# Patient Record
Sex: FEMALE | Race: BLACK OR AFRICAN AMERICAN | Employment: FULL TIME | URBAN - METROPOLITAN AREA
[De-identification: names, ages, dates, MRNs, and addresses within clinical notes are randomized per-mention and may not be internally consistent; named-entity substitution may affect disease eponyms.]

---

## 2017-02-28 ENCOUNTER — HOSPITAL ENCOUNTER (EMERGENCY)
Age: 22
Discharge: HOME | End: 2017-02-28
Attending: EMERGENCY MEDICINE
Payer: MEDICARE

## 2017-02-28 VITALS
RESPIRATION RATE: 16 BRPM | TEMPERATURE: 98.1 F | OXYGEN SATURATION: 100 % | SYSTOLIC BLOOD PRESSURE: 129 MMHG | HEART RATE: 98 BPM | DIASTOLIC BLOOD PRESSURE: 87 MMHG

## 2017-02-28 DIAGNOSIS — T78.40XA ALLERGIC REACTION, INITIAL ENCOUNTER: Primary | ICD-10-CM

## 2017-02-28 PROCEDURE — 99283 EMERGENCY DEPT VISIT LOW MDM: CPT

## 2017-02-28 PROCEDURE — 6370000000 HC RX 637 (ALT 250 FOR IP): Performed by: EMERGENCY MEDICINE

## 2017-02-28 RX ORDER — FAMOTIDINE 20 MG/1
20 TABLET, FILM COATED ORAL ONCE
Status: COMPLETED | OUTPATIENT
Start: 2017-02-28 | End: 2017-02-28

## 2017-02-28 RX ORDER — DIAPER,BRIEF,INFANT-TODD,DISP
EACH MISCELLANEOUS 2 TIMES DAILY
Status: DISCONTINUED | OUTPATIENT
Start: 2017-02-28 | End: 2017-02-28 | Stop reason: HOSPADM

## 2017-02-28 RX ORDER — DIPHENHYDRAMINE HCL 25 MG
25 CAPSULE ORAL EVERY 4 HOURS PRN
Qty: 10 CAPSULE | Refills: 0 | Status: SHIPPED | OUTPATIENT
Start: 2017-02-28 | End: 2017-03-10

## 2017-02-28 RX ORDER — EPINEPHRINE 0.3 MG/.3ML
0.3 INJECTION SUBCUTANEOUS ONCE
Qty: 0.3 ML | Refills: 0 | Status: SHIPPED | OUTPATIENT
Start: 2017-02-28 | End: 2020-07-18

## 2017-02-28 RX ORDER — FAMOTIDINE 20 MG/1
20 TABLET, FILM COATED ORAL 2 TIMES DAILY
Qty: 10 TABLET | Refills: 0 | Status: SHIPPED | OUTPATIENT
Start: 2017-02-28 | End: 2018-01-18 | Stop reason: ALTCHOICE

## 2017-02-28 RX ORDER — DIPHENHYDRAMINE HCL 25 MG
25 TABLET ORAL ONCE
Status: COMPLETED | OUTPATIENT
Start: 2017-02-28 | End: 2017-02-28

## 2017-02-28 RX ADMIN — FAMOTIDINE 20 MG: 20 TABLET, FILM COATED ORAL at 16:47

## 2017-02-28 RX ADMIN — HYDROCORTISONE: 10 CREAM TOPICAL at 16:52

## 2017-02-28 RX ADMIN — DIPHENHYDRAMINE HCL 25 MG: 25 TABLET ORAL at 16:47

## 2017-04-24 ENCOUNTER — HOSPITAL ENCOUNTER (EMERGENCY)
Age: 22
Discharge: HOME | End: 2017-04-24
Attending: EMERGENCY MEDICINE
Payer: MEDICARE

## 2017-04-24 VITALS
HEART RATE: 79 BPM | WEIGHT: 140 LBS | DIASTOLIC BLOOD PRESSURE: 62 MMHG | HEIGHT: 67 IN | BODY MASS INDEX: 21.97 KG/M2 | SYSTOLIC BLOOD PRESSURE: 111 MMHG | RESPIRATION RATE: 18 BRPM | OXYGEN SATURATION: 100 % | TEMPERATURE: 97.3 F

## 2017-04-24 DIAGNOSIS — N76.4 LABIAL ABSCESS: Primary | ICD-10-CM

## 2017-04-24 PROCEDURE — 2500000003 HC RX 250 WO HCPCS: Performed by: EMERGENCY MEDICINE

## 2017-04-24 PROCEDURE — 99283 EMERGENCY DEPT VISIT LOW MDM: CPT

## 2017-04-24 PROCEDURE — 56405 I&D VULVA/PERINEAL ABSCESS: CPT

## 2017-04-24 RX ORDER — SULFAMETHOXAZOLE AND TRIMETHOPRIM 800; 160 MG/1; MG/1
1 TABLET ORAL 2 TIMES DAILY
Qty: 14 TABLET | Refills: 0 | Status: SHIPPED | OUTPATIENT
Start: 2017-04-24 | End: 2017-05-01

## 2017-04-24 RX ORDER — LIDOCAINE HYDROCHLORIDE 10 MG/ML
20 INJECTION, SOLUTION INFILTRATION; PERINEURAL ONCE
Status: COMPLETED | OUTPATIENT
Start: 2017-04-24 | End: 2017-04-24

## 2017-04-24 RX ADMIN — LIDOCAINE HYDROCHLORIDE 20 ML: 10 INJECTION, SOLUTION INFILTRATION; PERINEURAL at 22:58

## 2017-04-24 ASSESSMENT — PAIN DESCRIPTION - LOCATION: LOCATION: GROIN

## 2017-04-24 ASSESSMENT — PAIN DESCRIPTION - PAIN TYPE: TYPE: ACUTE PAIN

## 2017-04-24 ASSESSMENT — PAIN DESCRIPTION - ORIENTATION: ORIENTATION: LEFT

## 2017-04-24 ASSESSMENT — PAIN SCALES - GENERAL: PAINLEVEL_OUTOF10: 10

## 2017-09-14 ENCOUNTER — HOSPITAL ENCOUNTER (EMERGENCY)
Age: 22
Discharge: HOME | End: 2017-09-14
Attending: EMERGENCY MEDICINE
Payer: MEDICARE

## 2017-09-14 ENCOUNTER — APPOINTMENT (OUTPATIENT)
Dept: ULTRASOUND IMAGING | Age: 22
End: 2017-09-14
Payer: MEDICARE

## 2017-09-14 VITALS
RESPIRATION RATE: 16 BRPM | HEART RATE: 77 BPM | TEMPERATURE: 98.1 F | SYSTOLIC BLOOD PRESSURE: 139 MMHG | OXYGEN SATURATION: 100 % | DIASTOLIC BLOOD PRESSURE: 74 MMHG

## 2017-09-14 DIAGNOSIS — Z3A.08 8 WEEKS GESTATION OF PREGNANCY: ICD-10-CM

## 2017-09-14 DIAGNOSIS — A49.8 GARDNERELLA INFECTION: ICD-10-CM

## 2017-09-14 DIAGNOSIS — R10.31 RIGHT LOWER QUADRANT ABDOMINAL PAIN: Primary | ICD-10-CM

## 2017-09-14 DIAGNOSIS — R03.0 ELEVATED BLOOD PRESSURE READING: ICD-10-CM

## 2017-09-14 LAB
-: NORMAL
ABSOLUTE EOS #: 0.1 K/UL (ref 0–0.4)
ABSOLUTE LYMPH #: 2.1 K/UL (ref 1–4.8)
ABSOLUTE MONO #: 0.8 K/UL (ref 0.1–1.4)
ALBUMIN SERPL-MCNC: 4.5 G/DL (ref 3.5–5.2)
ALBUMIN/GLOBULIN RATIO: 1.4 (ref 1–2.5)
ALP BLD-CCNC: 41 U/L (ref 35–104)
ALT SERPL-CCNC: 8 U/L (ref 5–33)
AMORPHOUS: NORMAL
ANION GAP SERPL CALCULATED.3IONS-SCNC: 13 MMOL/L (ref 9–17)
AST SERPL-CCNC: 15 U/L
BACTERIA: NORMAL
BASOPHILS # BLD: 1 %
BASOPHILS ABSOLUTE: 0.1 K/UL (ref 0–0.2)
BILIRUB SERPL-MCNC: 0.61 MG/DL (ref 0.3–1.2)
BILIRUBIN DIRECT: 0.14 MG/DL
BILIRUBIN URINE: NEGATIVE
BILIRUBIN, INDIRECT: 0.47 MG/DL (ref 0–1)
BUN BLDV-MCNC: 6 MG/DL (ref 6–20)
BUN/CREAT BLD: ABNORMAL (ref 9–20)
C-REACTIVE PROTEIN: 0.7 MG/L (ref 0–5)
CALCIUM SERPL-MCNC: 9.3 MG/DL (ref 8.6–10.4)
CASTS UA: NORMAL /LPF (ref 0–8)
CHLORIDE BLD-SCNC: 101 MMOL/L (ref 98–107)
CO2: 24 MMOL/L (ref 20–31)
COLOR: YELLOW
COMMENT UA: ABNORMAL
CREAT SERPL-MCNC: 0.45 MG/DL (ref 0.5–0.9)
CRYSTALS, UA: NORMAL /HPF
DIFFERENTIAL TYPE: ABNORMAL
DIRECT EXAM: ABNORMAL
EOSINOPHILS RELATIVE PERCENT: 1 %
EPITHELIAL CELLS UA: NORMAL /HPF (ref 0–5)
GFR AFRICAN AMERICAN: >60 ML/MIN
GFR NON-AFRICAN AMERICAN: >60 ML/MIN
GFR SERPL CREATININE-BSD FRML MDRD: ABNORMAL ML/MIN/{1.73_M2}
GFR SERPL CREATININE-BSD FRML MDRD: ABNORMAL ML/MIN/{1.73_M2}
GLOBULIN: NORMAL G/DL (ref 1.5–3.8)
GLUCOSE BLD-MCNC: 95 MG/DL (ref 70–99)
GLUCOSE URINE: NEGATIVE
HCG QUALITATIVE: POSITIVE
HCG QUANTITATIVE: ABNORMAL IU/L
HCT VFR BLD CALC: 37.5 % (ref 36–46)
HEMOGLOBIN: 12.8 G/DL (ref 12–16)
KETONES, URINE: ABNORMAL
LEUKOCYTE ESTERASE, URINE: ABNORMAL
LIPASE: 16 U/L (ref 13–60)
LYMPHOCYTES # BLD: 38 %
Lab: ABNORMAL
MCH RBC QN AUTO: 29.9 PG (ref 26–34)
MCHC RBC AUTO-ENTMCNC: 34 G/DL (ref 31–37)
MCV RBC AUTO: 88.1 FL (ref 80–100)
MONOCYTES # BLD: 15 %
MUCUS: NORMAL
NITRITE, URINE: NEGATIVE
OTHER OBSERVATIONS UA: NORMAL
PDW BLD-RTO: 16.2 % (ref 12.5–15.4)
PH UA: 7.5 (ref 5–8)
PLATELET # BLD: 240 K/UL (ref 140–450)
PLATELET ESTIMATE: ABNORMAL
PMV BLD AUTO: 7.8 FL (ref 6–12)
POTASSIUM SERPL-SCNC: 3.6 MMOL/L (ref 3.7–5.3)
PROTEIN UA: NEGATIVE
RBC # BLD: 4.26 M/UL (ref 4–5.2)
RBC # BLD: ABNORMAL 10*6/UL
RBC UA: NORMAL /HPF (ref 0–4)
RENAL EPITHELIAL, UA: NORMAL /HPF
SEG NEUTROPHILS: 45 %
SEGMENTED NEUTROPHILS ABSOLUTE COUNT: 2.4 K/UL (ref 1.8–7.7)
SODIUM BLD-SCNC: 138 MMOL/L (ref 135–144)
SPECIFIC GRAVITY UA: 1.02 (ref 1–1.03)
SPECIMEN DESCRIPTION: ABNORMAL
STATUS: ABNORMAL
TOTAL PROTEIN: 7.8 G/DL (ref 6.4–8.3)
TRICHOMONAS: NORMAL
TURBIDITY: ABNORMAL
URINE HGB: NEGATIVE
UROBILINOGEN, URINE: NORMAL
WBC # BLD: 5.5 K/UL (ref 4.5–13.5)
WBC # BLD: ABNORMAL 10*3/UL
WBC UA: NORMAL /HPF (ref 0–5)
YEAST: NORMAL

## 2017-09-14 PROCEDURE — 87660 TRICHOMONAS VAGIN DIR PROBE: CPT

## 2017-09-14 PROCEDURE — 83690 ASSAY OF LIPASE: CPT

## 2017-09-14 PROCEDURE — 96361 HYDRATE IV INFUSION ADD-ON: CPT

## 2017-09-14 PROCEDURE — 80076 HEPATIC FUNCTION PANEL: CPT

## 2017-09-14 PROCEDURE — 86140 C-REACTIVE PROTEIN: CPT

## 2017-09-14 PROCEDURE — G0383 LEV 4 HOSP TYPE B ED VISIT: HCPCS

## 2017-09-14 PROCEDURE — 87510 GARDNER VAG DNA DIR PROBE: CPT

## 2017-09-14 PROCEDURE — 87086 URINE CULTURE/COLONY COUNT: CPT

## 2017-09-14 PROCEDURE — 76705 ECHO EXAM OF ABDOMEN: CPT

## 2017-09-14 PROCEDURE — 6360000002 HC RX W HCPCS: Performed by: PHYSICIAN ASSISTANT

## 2017-09-14 PROCEDURE — 81001 URINALYSIS AUTO W/SCOPE: CPT

## 2017-09-14 PROCEDURE — 84703 CHORIONIC GONADOTROPIN ASSAY: CPT

## 2017-09-14 PROCEDURE — 96374 THER/PROPH/DIAG INJ IV PUSH: CPT

## 2017-09-14 PROCEDURE — 80048 BASIC METABOLIC PNL TOTAL CA: CPT

## 2017-09-14 PROCEDURE — 76815 OB US LIMITED FETUS(S): CPT

## 2017-09-14 PROCEDURE — 84702 CHORIONIC GONADOTROPIN TEST: CPT

## 2017-09-14 PROCEDURE — 87491 CHLMYD TRACH DNA AMP PROBE: CPT

## 2017-09-14 PROCEDURE — 85025 COMPLETE CBC W/AUTO DIFF WBC: CPT

## 2017-09-14 PROCEDURE — 87480 CANDIDA DNA DIR PROBE: CPT

## 2017-09-14 PROCEDURE — 2580000003 HC RX 258: Performed by: PHYSICIAN ASSISTANT

## 2017-09-14 PROCEDURE — 87591 N.GONORRHOEAE DNA AMP PROB: CPT

## 2017-09-14 RX ORDER — METRONIDAZOLE 500 MG/1
500 TABLET ORAL 2 TIMES DAILY
Qty: 14 TABLET | Refills: 0 | Status: SHIPPED | OUTPATIENT
Start: 2017-09-14 | End: 2017-09-21

## 2017-09-14 RX ORDER — CEPHALEXIN 500 MG/1
500 CAPSULE ORAL 2 TIMES DAILY
Qty: 14 CAPSULE | Refills: 0 | Status: SHIPPED | OUTPATIENT
Start: 2017-09-14 | End: 2017-09-21

## 2017-09-14 RX ORDER — ONDANSETRON 2 MG/ML
4 INJECTION INTRAMUSCULAR; INTRAVENOUS ONCE
Status: COMPLETED | OUTPATIENT
Start: 2017-09-14 | End: 2017-09-14

## 2017-09-14 RX ORDER — PNV NO.95/FERROUS FUM/FOLIC AC 28MG-0.8MG
1 TABLET ORAL DAILY
Qty: 30 TABLET | Refills: 0 | Status: SHIPPED | OUTPATIENT
Start: 2017-09-14 | End: 2018-01-18 | Stop reason: ALTCHOICE

## 2017-09-14 RX ORDER — 0.9 % SODIUM CHLORIDE 0.9 %
1000 INTRAVENOUS SOLUTION INTRAVENOUS ONCE
Status: COMPLETED | OUTPATIENT
Start: 2017-09-14 | End: 2017-09-14

## 2017-09-14 RX ORDER — DOXYLAMINE SUCCINATE AND PYRIDOXINE HYDROCHLORIDE, DELAYED RELEASE TABLETS 10 MG/10 MG 10; 10 MG/1; MG/1
2 TABLET, DELAYED RELEASE ORAL NIGHTLY
Qty: 60 TABLET | Refills: 0 | Status: SHIPPED | OUTPATIENT
Start: 2017-09-14 | End: 2018-01-18

## 2017-09-14 RX ADMIN — SODIUM CHLORIDE 1000 ML: 9 INJECTION, SOLUTION INTRAVENOUS at 15:23

## 2017-09-14 RX ADMIN — ONDANSETRON 4 MG: 2 INJECTION, SOLUTION INTRAMUSCULAR; INTRAVENOUS at 15:23

## 2017-09-14 ASSESSMENT — ENCOUNTER SYMPTOMS
EYE DISCHARGE: 0
VOMITING: 1
EYE ITCHING: 0
COUGH: 0
EYE PAIN: 0
COLOR CHANGE: 0
WHEEZING: 0
DIARRHEA: 1
RHINORRHEA: 0
NAUSEA: 1
BACK PAIN: 0
SORE THROAT: 0
ABDOMINAL PAIN: 1

## 2017-09-14 ASSESSMENT — PAIN DESCRIPTION - PAIN TYPE: TYPE: ACUTE PAIN

## 2017-09-14 ASSESSMENT — PAIN DESCRIPTION - PROGRESSION: CLINICAL_PROGRESSION: GRADUALLY WORSENING

## 2017-09-14 ASSESSMENT — PAIN DESCRIPTION - DESCRIPTORS: DESCRIPTORS: CRAMPING

## 2017-09-14 ASSESSMENT — PAIN DESCRIPTION - ONSET: ONSET: PROGRESSIVE

## 2017-09-14 ASSESSMENT — PAIN DESCRIPTION - ORIENTATION: ORIENTATION: RIGHT

## 2017-09-14 ASSESSMENT — PAIN SCALES - GENERAL: PAINLEVEL_OUTOF10: 9

## 2017-09-14 ASSESSMENT — PAIN DESCRIPTION - LOCATION: LOCATION: ABDOMEN

## 2017-09-15 LAB
C TRACH DNA GENITAL QL NAA+PROBE: NEGATIVE
CULTURE: NORMAL
CULTURE: NORMAL
Lab: NORMAL
N. GONORRHOEAE DNA: ABNORMAL
SPECIMEN DESCRIPTION: NORMAL
STATUS: NORMAL

## 2017-10-01 ENCOUNTER — HOSPITAL ENCOUNTER (EMERGENCY)
Age: 22
Discharge: HOME | End: 2017-10-01
Attending: EMERGENCY MEDICINE
Payer: MEDICARE

## 2017-10-01 VITALS
OXYGEN SATURATION: 100 % | BODY MASS INDEX: 20.4 KG/M2 | HEIGHT: 67 IN | RESPIRATION RATE: 18 BRPM | DIASTOLIC BLOOD PRESSURE: 89 MMHG | SYSTOLIC BLOOD PRESSURE: 120 MMHG | TEMPERATURE: 97.3 F | HEART RATE: 89 BPM | WEIGHT: 130 LBS

## 2017-10-01 DIAGNOSIS — A59.9 TRICHOMONAS VAGINALIS INFECTION: ICD-10-CM

## 2017-10-01 DIAGNOSIS — B96.89 GARDNERELLA VAGINALIS INFECTION: ICD-10-CM

## 2017-10-01 DIAGNOSIS — N30.00 ACUTE CYSTITIS WITHOUT HEMATURIA: Primary | ICD-10-CM

## 2017-10-01 DIAGNOSIS — N76.0 GARDNERELLA VAGINALIS INFECTION: ICD-10-CM

## 2017-10-01 DIAGNOSIS — O21.9 NAUSEA AND VOMITING DURING PREGNANCY PRIOR TO 22 WEEKS GESTATION: ICD-10-CM

## 2017-10-01 LAB
-: NORMAL
ABSOLUTE EOS #: 0 K/UL (ref 0–0.4)
ABSOLUTE LYMPH #: 1.7 K/UL (ref 1–4.8)
ABSOLUTE MONO #: 0.4 K/UL (ref 0.1–1.4)
AMORPHOUS: NORMAL
ANION GAP SERPL CALCULATED.3IONS-SCNC: 16 MMOL/L (ref 9–17)
BACTERIA: NORMAL
BASOPHILS # BLD: 1 %
BASOPHILS ABSOLUTE: 0 K/UL (ref 0–0.2)
BILIRUBIN URINE: NEGATIVE
BUN BLDV-MCNC: 8 MG/DL (ref 6–20)
BUN/CREAT BLD: ABNORMAL (ref 9–20)
CALCIUM SERPL-MCNC: 9.4 MG/DL (ref 8.6–10.4)
CASTS UA: NORMAL /LPF (ref 0–8)
CHLORIDE BLD-SCNC: 100 MMOL/L (ref 98–107)
CO2: 19 MMOL/L (ref 20–31)
COLOR: YELLOW
COMMENT UA: ABNORMAL
CREAT SERPL-MCNC: 0.46 MG/DL (ref 0.5–0.9)
CRYSTALS, UA: NORMAL /HPF
DIFFERENTIAL TYPE: ABNORMAL
DIRECT EXAM: ABNORMAL
EOSINOPHILS RELATIVE PERCENT: 1 %
EPITHELIAL CELLS UA: NORMAL /HPF (ref 0–5)
GFR AFRICAN AMERICAN: >60 ML/MIN
GFR NON-AFRICAN AMERICAN: >60 ML/MIN
GFR SERPL CREATININE-BSD FRML MDRD: ABNORMAL ML/MIN/{1.73_M2}
GFR SERPL CREATININE-BSD FRML MDRD: ABNORMAL ML/MIN/{1.73_M2}
GLUCOSE BLD-MCNC: 80 MG/DL (ref 70–99)
GLUCOSE URINE: NEGATIVE
HCG QUANTITATIVE: ABNORMAL IU/L
HCT VFR BLD CALC: 36.7 % (ref 36–46)
HEMOGLOBIN: 12.5 G/DL (ref 12–16)
KETONES, URINE: ABNORMAL
LEUKOCYTE ESTERASE, URINE: ABNORMAL
LYMPHOCYTES # BLD: 33 %
Lab: ABNORMAL
MCH RBC QN AUTO: 30.3 PG (ref 26–34)
MCHC RBC AUTO-ENTMCNC: 34 G/DL (ref 31–37)
MCV RBC AUTO: 89.1 FL (ref 80–100)
MONOCYTES # BLD: 8 %
MUCUS: NORMAL
NITRITE, URINE: NEGATIVE
OTHER OBSERVATIONS UA: NORMAL
PDW BLD-RTO: 15.9 % (ref 12.5–15.4)
PH UA: 6.5 (ref 5–8)
PLATELET # BLD: 226 K/UL (ref 140–450)
PLATELET ESTIMATE: ABNORMAL
PMV BLD AUTO: 7.8 FL (ref 6–12)
POTASSIUM SERPL-SCNC: 3.8 MMOL/L (ref 3.7–5.3)
PROTEIN UA: NEGATIVE
RBC # BLD: 4.12 M/UL (ref 4–5.2)
RBC # BLD: ABNORMAL 10*6/UL
RBC UA: NORMAL /HPF (ref 0–4)
RENAL EPITHELIAL, UA: NORMAL /HPF
SEG NEUTROPHILS: 57 %
SEGMENTED NEUTROPHILS ABSOLUTE COUNT: 3 K/UL (ref 1.8–7.7)
SODIUM BLD-SCNC: 135 MMOL/L (ref 135–144)
SPECIFIC GRAVITY UA: 1.03 (ref 1–1.03)
SPECIMEN DESCRIPTION: ABNORMAL
STATUS: ABNORMAL
TRICHOMONAS: NORMAL
TURBIDITY: ABNORMAL
URINE HGB: NEGATIVE
UROBILINOGEN, URINE: NORMAL
WBC # BLD: 5.2 K/UL (ref 4.5–13.5)
WBC # BLD: ABNORMAL 10*3/UL
WBC UA: NORMAL /HPF (ref 0–5)
YEAST: NORMAL

## 2017-10-01 PROCEDURE — 99284 EMERGENCY DEPT VISIT MOD MDM: CPT

## 2017-10-01 PROCEDURE — 84702 CHORIONIC GONADOTROPIN TEST: CPT

## 2017-10-01 PROCEDURE — 81001 URINALYSIS AUTO W/SCOPE: CPT

## 2017-10-01 PROCEDURE — 87491 CHLMYD TRACH DNA AMP PROBE: CPT

## 2017-10-01 PROCEDURE — 87480 CANDIDA DNA DIR PROBE: CPT

## 2017-10-01 PROCEDURE — 6360000002 HC RX W HCPCS: Performed by: STUDENT IN AN ORGANIZED HEALTH CARE EDUCATION/TRAINING PROGRAM

## 2017-10-01 PROCEDURE — 86403 PARTICLE AGGLUT ANTBDY SCRN: CPT

## 2017-10-01 PROCEDURE — 96361 HYDRATE IV INFUSION ADD-ON: CPT

## 2017-10-01 PROCEDURE — 87510 GARDNER VAG DNA DIR PROBE: CPT

## 2017-10-01 PROCEDURE — 87591 N.GONORRHOEAE DNA AMP PROB: CPT

## 2017-10-01 PROCEDURE — 87660 TRICHOMONAS VAGIN DIR PROBE: CPT

## 2017-10-01 PROCEDURE — 80048 BASIC METABOLIC PNL TOTAL CA: CPT

## 2017-10-01 PROCEDURE — 85025 COMPLETE CBC W/AUTO DIFF WBC: CPT

## 2017-10-01 PROCEDURE — 87086 URINE CULTURE/COLONY COUNT: CPT

## 2017-10-01 PROCEDURE — 96374 THER/PROPH/DIAG INJ IV PUSH: CPT

## 2017-10-01 PROCEDURE — 2580000003 HC RX 258: Performed by: STUDENT IN AN ORGANIZED HEALTH CARE EDUCATION/TRAINING PROGRAM

## 2017-10-01 RX ORDER — ONDANSETRON 4 MG/1
4 TABLET, ORALLY DISINTEGRATING ORAL EVERY 8 HOURS PRN
Qty: 30 TABLET | Refills: 0 | Status: SHIPPED | OUTPATIENT
Start: 2017-10-01 | End: 2017-12-04 | Stop reason: ALTCHOICE

## 2017-10-01 RX ORDER — CEPHALEXIN 500 MG/1
500 CAPSULE ORAL 4 TIMES DAILY
Qty: 28 CAPSULE | Refills: 0 | Status: SHIPPED | OUTPATIENT
Start: 2017-10-01 | End: 2017-10-08

## 2017-10-01 RX ORDER — 0.9 % SODIUM CHLORIDE 0.9 %
1000 INTRAVENOUS SOLUTION INTRAVENOUS ONCE
Status: COMPLETED | OUTPATIENT
Start: 2017-10-01 | End: 2017-10-01

## 2017-10-01 RX ORDER — METRONIDAZOLE 500 MG/1
500 TABLET ORAL 2 TIMES DAILY
Qty: 14 TABLET | Refills: 0 | Status: SHIPPED | OUTPATIENT
Start: 2017-10-01 | End: 2017-12-04 | Stop reason: ALTCHOICE

## 2017-10-01 RX ORDER — ONDANSETRON 2 MG/ML
4 INJECTION INTRAMUSCULAR; INTRAVENOUS ONCE
Status: COMPLETED | OUTPATIENT
Start: 2017-10-01 | End: 2017-10-01

## 2017-10-01 RX ADMIN — ONDANSETRON 4 MG: 2 INJECTION, SOLUTION INTRAMUSCULAR; INTRAVENOUS at 21:17

## 2017-10-01 RX ADMIN — SODIUM CHLORIDE 1000 ML: 9 INJECTION, SOLUTION INTRAVENOUS at 21:17

## 2017-10-01 ASSESSMENT — ENCOUNTER SYMPTOMS
DIARRHEA: 0
NAUSEA: 1
ABDOMINAL PAIN: 1
SHORTNESS OF BREATH: 0
VOMITING: 1
CONSTIPATION: 1

## 2017-10-01 ASSESSMENT — PAIN DESCRIPTION - PAIN TYPE: TYPE: ACUTE PAIN

## 2017-10-01 ASSESSMENT — PAIN DESCRIPTION - LOCATION: LOCATION: ABDOMEN

## 2017-10-01 ASSESSMENT — PAIN SCALES - GENERAL: PAINLEVEL_OUTOF10: 3

## 2017-10-01 NOTE — LETTER
[facility]  [address] [facility] [address]  Phone: [phone]        October 3, 2017      Dear Gloria Sánchez  At the time of your visit to [facility] Emergency Room on  a culture was obtained. It was positive for gonorrhea  Your sex partner needs to be treated also. For treatment you need to follow up with your OB or health department.      Sincerely,        [facility]

## 2017-10-01 NOTE — ED AVS SNAPSHOT
After Visit Summary  (Discharge Instructions)    Medication List for Home    Based on the information you provided to us as well as any changes during this visit, the following is your updated medication list.  Compare this with your prescription bottles at home. If you have any questions or concerns, contact your primary care physician's office. Daily Medication List (This medication list can be shared with any Healthcare provider who is helping you manage your medications)      There are NEW medications for you. START taking them after you leave the hospital     cephALEXin 500 MG capsule   Commonly known as:  KEFLEX   Take 1 capsule by mouth 4 times daily for 7 days       metroNIDAZOLE 500 MG tablet   Commonly known as:  FLAGYL   Take 1 tablet by mouth 2 times daily Take with Food. Do NOT drink alcohol. ondansetron 4 MG disintegrating tablet   Commonly known as:  ZOFRAN ODT   Take 1 tablet by mouth every 8 hours as needed for Nausea         These are medications you told us you were taking at home, CONTINUE taking them after you leave the hospital     doxylamine-pyridoxine 10-10 MG Tbec   Commonly known as:  DICLEGIS   Take 2 tablets by mouth nightly Please take 2 tablets at bedtime.   If your symptoms persist after 2 days please increase to 1 tablet in the morning and 2 tablets at bedtime       EPINEPHrine 0.3 MG/0.3ML Soaj injection   Commonly known as:  EPIPEN 2-JEAN   Inject 0.3 mLs into the muscle once for 1 dose Use as directed for allergic reaction       Prenatal Vitamins 28-0.8 MG Tabs   Take 1 tablet by mouth daily         ASK your doctor about these medications if you have questions     famotidine 20 MG tablet   Commonly known as:  PEPCID   Take 1 tablet by mouth 2 times daily         These are the medications you have told us you were taking at home, STOP taking them after you leave the hospital     misoprostol 200 MCG tablet   Commonly known as:  CYTOTEC ondansetron 4 MG tablet   Commonly known as:  Trent Lopes            Where to Get Your Medications      You can get these medications from any pharmacy     Bring a paper prescription for each of these medications     cephALEXin 500 MG capsule    metroNIDAZOLE 500 MG tablet    ondansetron 4 MG disintegrating tablet               Allergies as of 10/1/2017     No Known Allergies      Immunizations as of 10/1/2017     Name Date Dose VIS Date Route    Influenza Virus Vaccine 10/14/2014 0.5 mL 2014 Intramuscular         After Visit Summary    This summary was created for you. Thank you for entrusting your care to us. The following information includes details about your hospital/visit stay along with steps you should take to help with your recovery once you leave the hospital.  In this packet, you will find information about the topics listed below:    · Instructions about your medications including a list of your home medications  · A summary of your hospital visit  · Follow-up appointments once you have left the hospital  · Your care plan at home      You may receive a survey regarding the care you received during your stay. Your input is valuable to us. We encourage you to complete and return your survey in the envelope provided. We hope you will choose us in the future for your healthcare needs. Patient Information     Patient Name RENETTA Arredondo 1995      Care Provided at:     Name Address Phone       [address] [id number] [phone]            Your Visit    Here you will find information about your visit, including the reason for your visit. Please take this sheet with you when you visit your doctor or other health care provider in the future. It will help determine the best possible medical care for you at that time. If you have any questions once you leave the hospital, please call the department phone number listed below. \" Turn LEFT onto [address]   \" Turn LEFT onto [address]   \" [address] is on the LEFT      From the [place]   \" Merge onto [address] / [address] toward [place]   \" Merge onto [address] via [address] on the LEFT toward [place]   \" Take the [address] exit, [address]   \" Take the [address] ramp   \" Turn LEFT onto [address]   \" Turn LEFT onto [address]   \" [address] is on the LEFT      From the [facility]   \" Merge onto [address] toward [place] / [place]   \" Take the [address] exit, [address]   \" Take the [address] ramp   \" Turn LEFT onto [address]   \" Turn LEFT onto [address] [place]   \" [address] is on the LEFT      Future Appointments     10/23/2017 3:15 PM     Appointment with Harini Jacobo DO at [address] ([phone])   Please arrive 15 minutes prior to scheduled appointment time to complete paper work, bring photo ID and insurance cards. [address] [facility]         Preventive Care        Date Due    Meningococcal Vaccine (1 of 1) 9/10/2012    Tetanus Combination Vaccine (1 - Tdap) 9/10/2015    Pneumococcal Vaccine - Pneumovax for adults aged 19-64 years with: chronic heart disease, chronic lung disease, diabetes mellitus, alcoholism, chronic liver disease, or cigarette smoking. (1 of 1 - PPSV23) 9/10/2015    Yearly Flu Vaccine (1) 9/1/2017    Pap Smear 9/10/2017            Ordered Labs Pending Results     Order Current Status    C.trachomatis N.gonorrhoeae DNA In process    Urine Culture In process           Care Plan Once You Return Home    This section includes instructions you will need to follow once you leave the hospital.  Your care team will discuss these with you, so you and those caring for you know how to best care for your health needs at home. This section may also include educational information about certain health topics that may be of help to you. and go to all appointments, and call your doctor if you are having problems. It's also a good idea to know your test results and keep a list of the medicines you take. How can you care for yourself at home? · Take your antibiotics as directed. Do not stop taking them just because you feel better. You need to take the full course of antibiotics. · Do not eat or drink anything that contains alcohol if you are taking metronidazole (Flagyl). · Keep using your medicine if you start your period. Use pads instead of tampons while using a vaginal cream or suppository. Tampons can absorb the medicine. · Wear loose cotton clothing. Do not wear nylon and other materials that hold body heat and moisture close to the skin. · Do not scratch. Relieve itching with a cold pack or a cool bath. · Do not wash your vaginal area more than once a day. Use plain water or a mild, unscented soap. Do not douche. When should you call for help? Watch closely for changes in your health, and be sure to contact your doctor if:  · You have unexpected vaginal bleeding. · You have a fever. · You have new or increased pain in your vagina or pelvis. · You are not getting better after 1 week. · Your symptoms return after you finish the course of your medicine. Where can you learn more? Go to [email or web address]://Control4.MyWave. org and sign in to your [employer] account. Enter S548 in the [facility] box to learn more about \"Bacterial Vaginosis: Care Instructions. \"     If you do not have an account, please click on the \"Sign Up Now\" link. Current as of: October 13, 2016  Content Version: 11.3  © 3012-1521 [employer]. Care instructions adapted under license by [facility] ([facility]). If you have questions about a medical condition or this instruction, always ask your healthcare professional. [address] any warranty or liability for your use of this information. Urinary Tract Infection in Pregnancy: Care Instructions  Your Care Instructions    A urinary tract infection, or UTI, is an infection of the bladder and other urinary structures. Most UTIs occur in the bladder. They often cause pain or burning when you urinate. UTI is the most common bacterial infection in pregnancy. If untreated, a UTI could lead to problems such as a kidney infection or  labor. Most UTIs can be cured with antibiotics. Your doctor will prescribe an antibiotic that is safe during pregnancy. Be sure to finish your medicine so that the infection does not spread to your kidneys. Follow-up care is a key part of your treatment and safety. Be sure to make and go to all appointments, and call your doctor if you are having problems. It's also a good idea to know your test results and keep a list of the medicines you take. How can you care for yourself at home? · Take your antibiotics as directed. Do not stop taking them just because you feel better. You need to take the full course of antibiotics. · Drink extra water and other fluids for the next day or two. This will help wash out the bacteria causing the infection. If you have kidney, heart, or liver disease and have to limit fluids, talk with your doctor before you increase the amount of fluids you drink. · Do not drink alcohol. · Urinate often. Try to empty your bladder each time. Preventing UTIs  · Drink plenty of fluids, enough so that your urine is light yellow or clear like water. This helps you urinate often, which clears bacteria from your system. If you have kidney, heart, or liver disease and have to limit fluids, talk with your doctor before you increase the amount of fluids you drink. · Urinate when you first have the urge. · Urinate right after you have sex. This is the best way for women to avoid UTIs. · When going to the bathroom, wipe from front to back to keep bacteria from entering the vagina or urethra.

## 2017-10-02 LAB
C TRACH DNA GENITAL QL NAA+PROBE: NEGATIVE
CULTURE: ABNORMAL
CULTURE: ABNORMAL
Lab: ABNORMAL
N. GONORRHOEAE DNA: ABNORMAL
SPECIMEN DESCRIPTION: ABNORMAL
STATUS: ABNORMAL

## 2017-10-02 NOTE — ED PROVIDER NOTES
Concern    Not on file     Social History Narrative    [facility]: [address] [facility]. with: Aunt           Family History   Problem Relation Age of Onset    Heart Defect Mother      \"Hole in the heart\" as a child    Seizures Sister     Heart Defect Maternal Aunt      \"Hole in the heart,\" unsure of dx, surgically repaired     Breast Cancer Neg Hx     Uterine Cancer Neg Hx     Ovarian Cancer Neg Hx     Colon Cancer Neg Hx        Allergies:  Review of patient's allergies indicates no known allergies. Home Medications:  Prior to Admission medications    Medication Sig Start Date End Date Taking? Authorizing Provider   metroNIDAZOLE (FLAGYL) 500 MG tablet Take 1 tablet by mouth 2 times daily Take with Food. Do NOT drink alcohol. 10/1/17  Yes Carlos Rodriguez MD   cephALEXin (KEFLEX) 500 MG capsule Take 1 capsule by mouth 4 times daily for 7 days 10/1/17 10/8/17 Yes Carlos Rodriguez MD   ondansetron (ZOFRAN ODT) 4 MG disintegrating tablet Take 1 tablet by mouth every 8 hours as needed for Nausea 10/1/17  Yes Carlos Rodriguez MD   doxylamine-pyridoxine (DICLEGIS) 10-10 MG TBEC Take 2 tablets by mouth nightly Please take 2 tablets at bedtime. If your symptoms persist after 2 days please increase to 1 tablet in the morning and 2 tablets at bedtime 9/14/17   Loreta Salinas PA-C   Prenatal Vit-Fe Fumarate-FA (PRENATAL VITAMINS) 28-0.8 MG TABS Take 1 tablet by mouth daily 9/14/17   Loreta Salinas PA-C   famotidine (PEPCID) 20 MG tablet Take 1 tablet by mouth 2 times daily 2/28/17   Dixon Watson MD PhD   EPINEPHrine (EPIPEN 2-JEAN) 0.3 MG/0.3ML SOAJ injection Inject 0.3 mLs into the muscle once for 1 dose Use as directed for allergic reaction 2/28/17 2/28/17  Dixon Watson MD PhD       REVIEW OF SYSTEMS    (2-9 systems for level 4, 10 or more for level 5)      Review of Systems   Constitutional: Positive for fatigue. Negative for chills and fever. Respiratory: Negative for shortness of breath. Cardiovascular: Negative for chest pain. Gastrointestinal: Positive for abdominal pain, constipation, nausea and vomiting. Negative for diarrhea. Genitourinary: Positive for decreased urine volume and pelvic pain. Negative for dysuria and hematuria. Neurological: Negative for light-headedness and headaches. PHYSICAL EXAM   (up to 7 for level 4, 8 or more for level 5)      INITIAL VITALS:   /89  Pulse 89  Temp 97.3 °F (36.3 °C)  Resp 18  Ht 5' 7\" (1.702 m)  Wt 130 lb (59 kg)  LMP 09/03/2017  SpO2 100%  BMI 20.36 kg/m2    Physical Exam   Constitutional: She is oriented to person, place, and time. She appears well-developed and well-nourished. No distress. HENT:   Head: Normocephalic and atraumatic. Mouth/Throat: Mucous membranes are dry. Eyes: Conjunctivae and EOM are normal. Pupils are equal, round, and reactive to light. Neck: Normal range of motion. Neck supple. Cardiovascular: Normal rate, regular rhythm and intact distal pulses. Exam reveals no gallop and no friction rub. No murmur heard. Abdominal: Soft. There is tenderness. RLQ tenderness to palpation   Genitourinary: Vaginal discharge found. Genitourinary Comments: Thin white vaginal discharge noted on pelvic exam. Cervical os closed. No bleeding. No products of conception. Musculoskeletal: Normal range of motion. Neurological: She is alert and oriented to person, place, and time. Skin: Skin is warm and dry.        DIFFERENTIAL  DIAGNOSIS     PLAN (LABS / IMAGING / EKG):  Orders Placed This Encounter   Procedures    VAGINITIS DNA PROBE    C.trachomatis N.gonorrhoeae DNA    Urine Culture    CBC Auto Differential    UA W/REFLEX CULTURE    BASIC METABOLIC PANEL    HCG, Quantitative, Pregnancy    Microscopic Urinalysis    Vaginal exam       MEDICATIONS ORDERED:  Orders Placed This Encounter   Medications    0.9 % sodium chloride bolus    ondansetron (ZOFRAN) injection 4 mg    metroNIDAZOLE (FLAGYL) - 1.4 k/uL    Absolute Eos # 0.00 0.0 - 0.4 k/uL    Basophils # 0.00 0.0 - 0.2 k/uL    WBC Morphology NOT REPORTED     RBC Morphology ANISOCYTOSIS PRESENT     Platelet Estimate NOT REPORTED    UA W/REFLEX CULTURE   Result Value Ref Range    Color, UA YELLOW YEL    Turbidity UA CLOUDY (A) CLEAR    Glucose, Ur NEGATIVE NEG    Bilirubin Urine NEGATIVE NEG    Ketones, Urine LARGE (A) NEG    Specific Gravity, UA 1.026 1.005 - 1.030    Urine Hgb NEGATIVE NEG    pH, UA 6.5 5.0 - 8.0    Protein, UA NEGATIVE NEG    Urobilinogen, Urine Normal NORM    Nitrite, Urine NEGATIVE NEG    Leukocyte Esterase, Urine MODERATE (A) NEG    Urinalysis Comments NOT REPORTED    BASIC METABOLIC PANEL   Result Value Ref Range    Glucose 80 70 - 99 mg/dL    BUN 8 6 - 20 mg/dL    CREATININE 0.46 (L) 0.50 - 0.90 mg/dL    Bun/Cre Ratio NOT REPORTED 9 - 20    Calcium 9.4 8.6 - 10.4 mg/dL    Sodium 135 135 - 144 mmol/L    Potassium 3.8 3.7 - 5.3 mmol/L    Chloride 100 98 - 107 mmol/L    CO2 19 (L) 20 - 31 mmol/L    Anion Gap 16 9 - 17 mmol/L    GFR Non-African American >60 >60 mL/min    GFR African American >60 >60 mL/min    GFR Comment          GFR Staging NOT REPORTED    HCG, Quantitative, Pregnancy   Result Value Ref Range    hCG Quant 737017 (H) <5 IU/L   Microscopic Urinalysis   Result Value Ref Range    -          WBC, UA 50  0 - 5 /HPF    RBC, UA 0 TO 2 0 - 4 /HPF    Casts UA 50  0 - 8 /LPF    Crystals UA NOT REPORTED NONE /HPF    Epithelial Cells UA 2 TO 5 0 - 5 /HPF    Renal Epithelial, Urine NOT REPORTED 0 /HPF    Bacteria, UA NOT REPORTED NONE    Mucus, UA NOT REPORTED NONE    Trichomonas, UA NOT REPORTED NONE    Amorphous, UA NOT REPORTED NONE    Other Observations UA NOT REPORTED NREQ    Yeast, UA NOT REPORTED NONE       IMPRESSION: Urinary tract infection, Trichomonas infection, Gardnerella infection, quantitative hCG is rising appropriately    RADIOLOGY:  None    EKG  None    All EKG's are interpreted by the Emergency symptoms worsen, As needed    [address] [address] [phone]  On 10/23/2017  at 3:00. Please bring insurance card and photo ID. DISCHARGE MEDICATIONS:  New Prescriptions    CEPHALEXIN (KEFLEX) 500 MG CAPSULE    Take 1 capsule by mouth 4 times daily for 7 days    METRONIDAZOLE (FLAGYL) 500 MG TABLET    Take 1 tablet by mouth 2 times daily Take with Food. Do NOT drink alcohol. ONDANSETRON (ZOFRAN ODT) 4 MG DISINTEGRATING TABLET    Take 1 tablet by mouth every 8 hours as needed for Nausea       Brandt Burns D.O.   Emergency Medicine Resident    (Please note that portions of this note were completed with a voice recognition program.  Efforts were made to edit the dictations but occasionally words are mis-transcribed.)     Calin Lunsford MD  10/01/17 7277

## 2017-10-02 NOTE — ED PROVIDER NOTES
[facility] ED     Emergency Department     Faculty Attestation    I performed a history and physical examination of the patient and discussed management with the resident. I reviewed the residents note and agree with the documented findings and plan of care. Any areas of disagreement are noted on the chart. I was personally present for the key portions of any procedures. I have documented in the chart those procedures where I was not present during the key portions. I have reviewed the emergency nurses triage note. I agree with the chief complaint, past medical history, past surgical history, allergies, medications, social and family history as documented unless otherwise noted below. For Physician Assistant/ Nurse Practitioner cases/documentation I have personally evaluated this patient and have completed at least one if not all key elements of the E/M (history, physical exam, and MDM). Additional findings are as noted. Patient presents with nausea, vomiting, and lower abdominal pain that she has had for the past 4 weeks. Patient was seen here about 4 weeks ago and found out that she was pregnant at the time. There was concern for appendicitis at that time. Ultrasound was performed but was inconclusive for showing the appendix. Surgery did see the patient and did not feel was appendicitis at that time and patient was discharged home with prescription for likely just as well as Keflex for UTI. Patient says she has been trying to take directly to his been unable to keep it down. She says she stopped taking the Keflex because she was unable to keep that down. She says that the abdominal pain has continued. She says that it comes and goes. She says she has been vomiting about 3 times per day. She also says she has been constipated. Patient is  with one previous miscarriage. On exam, patient is resting comfortably in the bed.   Lungs clear to auscultation bilaterally heart sounds

## 2017-12-04 ENCOUNTER — HOSPITAL ENCOUNTER (EMERGENCY)
Age: 22
Discharge: HOME | End: 2017-12-04
Attending: EMERGENCY MEDICINE
Payer: MEDICARE

## 2017-12-04 VITALS
OXYGEN SATURATION: 100 % | SYSTOLIC BLOOD PRESSURE: 118 MMHG | HEIGHT: 67 IN | RESPIRATION RATE: 17 BRPM | DIASTOLIC BLOOD PRESSURE: 75 MMHG | TEMPERATURE: 97.7 F | HEART RATE: 95 BPM

## 2017-12-04 DIAGNOSIS — N76.0 BV (BACTERIAL VAGINOSIS): ICD-10-CM

## 2017-12-04 DIAGNOSIS — N89.8 VAGINAL DISCHARGE DURING PREGNANCY IN SECOND TRIMESTER: ICD-10-CM

## 2017-12-04 DIAGNOSIS — O26.892 VAGINAL DISCHARGE DURING PREGNANCY IN SECOND TRIMESTER: ICD-10-CM

## 2017-12-04 DIAGNOSIS — O26.892 ABDOMINAL PAIN DURING PREGNANCY IN SECOND TRIMESTER: ICD-10-CM

## 2017-12-04 DIAGNOSIS — R10.9 ABDOMINAL PAIN DURING PREGNANCY IN SECOND TRIMESTER: ICD-10-CM

## 2017-12-04 DIAGNOSIS — B96.89 BV (BACTERIAL VAGINOSIS): ICD-10-CM

## 2017-12-04 DIAGNOSIS — A54.9 GONORRHEA: ICD-10-CM

## 2017-12-04 DIAGNOSIS — A59.9 TRICHOMONAS INFECTION: ICD-10-CM

## 2017-12-04 DIAGNOSIS — O21.9 NAUSEA AND VOMITING DURING PREGNANCY: Primary | ICD-10-CM

## 2017-12-04 LAB
-: NORMAL
AMORPHOUS: NORMAL
BACTERIA: NORMAL
BILIRUBIN URINE: NEGATIVE
CASTS UA: NORMAL /LPF (ref 0–8)
COLOR: YELLOW
COMMENT UA: ABNORMAL
CRYSTALS, UA: NORMAL /HPF
DIRECT EXAM: ABNORMAL
EPITHELIAL CELLS UA: NORMAL /HPF (ref 0–5)
GLUCOSE URINE: NEGATIVE
KETONES, URINE: NEGATIVE
LEUKOCYTE ESTERASE, URINE: ABNORMAL
Lab: ABNORMAL
MUCUS: NORMAL
NITRITE, URINE: NEGATIVE
OTHER OBSERVATIONS UA: NORMAL
PH UA: >9 (ref 5–8)
PROTEIN UA: NEGATIVE
RBC UA: NORMAL /HPF (ref 0–4)
RENAL EPITHELIAL, UA: NORMAL /HPF
SPECIFIC GRAVITY UA: 1.02 (ref 1–1.03)
SPECIMEN DESCRIPTION: ABNORMAL
STATUS: ABNORMAL
TRICHOMONAS: NORMAL
TURBIDITY: ABNORMAL
URINE HGB: NEGATIVE
UROBILINOGEN, URINE: NORMAL
WBC UA: NORMAL /HPF (ref 0–5)
YEAST: NORMAL

## 2017-12-04 PROCEDURE — 96372 THER/PROPH/DIAG INJ SC/IM: CPT

## 2017-12-04 PROCEDURE — 99285 EMERGENCY DEPT VISIT HI MDM: CPT

## 2017-12-04 PROCEDURE — 6370000000 HC RX 637 (ALT 250 FOR IP): Performed by: PHYSICIAN ASSISTANT

## 2017-12-04 PROCEDURE — 87510 GARDNER VAG DNA DIR PROBE: CPT

## 2017-12-04 PROCEDURE — 87591 N.GONORRHOEAE DNA AMP PROB: CPT

## 2017-12-04 PROCEDURE — 96374 THER/PROPH/DIAG INJ IV PUSH: CPT

## 2017-12-04 PROCEDURE — 87480 CANDIDA DNA DIR PROBE: CPT

## 2017-12-04 PROCEDURE — 81001 URINALYSIS AUTO W/SCOPE: CPT

## 2017-12-04 PROCEDURE — 2580000003 HC RX 258: Performed by: PHYSICIAN ASSISTANT

## 2017-12-04 PROCEDURE — 87086 URINE CULTURE/COLONY COUNT: CPT

## 2017-12-04 PROCEDURE — 87491 CHLMYD TRACH DNA AMP PROBE: CPT

## 2017-12-04 PROCEDURE — 87660 TRICHOMONAS VAGIN DIR PROBE: CPT

## 2017-12-04 PROCEDURE — 6360000002 HC RX W HCPCS: Performed by: PHYSICIAN ASSISTANT

## 2017-12-04 RX ORDER — CEPHALEXIN 500 MG/1
500 CAPSULE ORAL 2 TIMES DAILY
Qty: 14 CAPSULE | Refills: 0 | Status: SHIPPED | OUTPATIENT
Start: 2017-12-04 | End: 2017-12-11

## 2017-12-04 RX ORDER — METRONIDAZOLE 500 MG/1
500 TABLET ORAL 2 TIMES DAILY
Qty: 14 TABLET | Refills: 0 | Status: SHIPPED | OUTPATIENT
Start: 2017-12-04 | End: 2017-12-11

## 2017-12-04 RX ORDER — METRONIDAZOLE 500 MG/1
500 TABLET ORAL ONCE
Status: COMPLETED | OUTPATIENT
Start: 2017-12-04 | End: 2017-12-04

## 2017-12-04 RX ORDER — CEPHALEXIN 250 MG/1
500 CAPSULE ORAL ONCE
Status: COMPLETED | OUTPATIENT
Start: 2017-12-04 | End: 2017-12-04

## 2017-12-04 RX ORDER — CEFTRIAXONE SODIUM 250 MG/1
250 INJECTION, POWDER, FOR SOLUTION INTRAMUSCULAR; INTRAVENOUS ONCE
Status: COMPLETED | OUTPATIENT
Start: 2017-12-04 | End: 2017-12-04

## 2017-12-04 RX ORDER — ONDANSETRON 2 MG/ML
4 INJECTION INTRAMUSCULAR; INTRAVENOUS ONCE
Status: COMPLETED | OUTPATIENT
Start: 2017-12-04 | End: 2017-12-04

## 2017-12-04 RX ORDER — 0.9 % SODIUM CHLORIDE 0.9 %
1000 INTRAVENOUS SOLUTION INTRAVENOUS ONCE
Status: COMPLETED | OUTPATIENT
Start: 2017-12-04 | End: 2017-12-04

## 2017-12-04 RX ORDER — AZITHROMYCIN 250 MG/1
1000 TABLET, FILM COATED ORAL ONCE
Status: COMPLETED | OUTPATIENT
Start: 2017-12-04 | End: 2017-12-04

## 2017-12-04 RX ADMIN — CEFTRIAXONE SODIUM 250 MG: 250 INJECTION, POWDER, FOR SOLUTION INTRAMUSCULAR; INTRAVENOUS at 14:06

## 2017-12-04 RX ADMIN — SODIUM CHLORIDE 1000 ML: 9 INJECTION, SOLUTION INTRAVENOUS at 12:14

## 2017-12-04 RX ADMIN — CEPHALEXIN 500 MG: 250 CAPSULE ORAL at 15:01

## 2017-12-04 RX ADMIN — ONDANSETRON 4 MG: 2 INJECTION INTRAMUSCULAR; INTRAVENOUS at 12:14

## 2017-12-04 RX ADMIN — AZITHROMYCIN 1000 MG: 250 TABLET, FILM COATED ORAL at 14:06

## 2017-12-04 RX ADMIN — METRONIDAZOLE 500 MG: 500 TABLET ORAL at 15:01

## 2017-12-04 ASSESSMENT — PAIN DESCRIPTION - LOCATION: LOCATION: ABDOMEN

## 2017-12-04 ASSESSMENT — PAIN DESCRIPTION - DESCRIPTORS: DESCRIPTORS: CONSTANT

## 2017-12-04 ASSESSMENT — ENCOUNTER SYMPTOMS
BACK PAIN: 0
COLOR CHANGE: 0
EYE DISCHARGE: 0
NAUSEA: 1
VOMITING: 1
SORE THROAT: 0
ABDOMINAL PAIN: 1
EYE PAIN: 0
DIARRHEA: 0
RHINORRHEA: 0
WHEEZING: 0
EYE ITCHING: 0
COUGH: 0

## 2017-12-04 ASSESSMENT — PAIN DESCRIPTION - ORIENTATION: ORIENTATION: MID;LOWER

## 2017-12-04 ASSESSMENT — PAIN DESCRIPTION - FREQUENCY: FREQUENCY: CONTINUOUS

## 2017-12-04 ASSESSMENT — PAIN SCALES - GENERAL: PAINLEVEL_OUTOF10: 6

## 2017-12-04 ASSESSMENT — PAIN DESCRIPTION - PAIN TYPE: TYPE: ACUTE PAIN

## 2017-12-04 NOTE — ED NOTES
Writer met with patient to discuss follow up care following ER visit. Patient reports that she previously delivered at [facility], and is agreeable to referral to [facility]. Writer assisted in scheduling appointment for 12/14/17 at 11:00am. Writer provided contact information for [facility] as requested by patient.     ALFRED Gamboa/MSW intern     Ashutosh Kline [place]  12/04/17 3997

## 2017-12-04 NOTE — PROGRESS NOTES
I assigned myself to this patient in error. I did not see, evaluate, or participate in the care of this patient. Edward LAND.   Emergency Medicine Resident Physician

## 2017-12-04 NOTE — ED NOTES
Pt asleep, awakens easily, respirations even and non labored. Updated will be doing pelvic. Pt reports feeling better, just tired.      Ashly Blanco RN  12/04/17 [id number]

## 2017-12-04 NOTE — LETTER
[facility]  [address] [facility] [address]  Phone: [phone]          December 19, 2017    Dear Radha Santillan    At the time of your visit to [facility] Emergency Room on , A culture was obtained. It was positive for : gonorrhea  Your sex partner needs to be treated also. You received treatment at the time of your visit. Follow up with your primary care physician is important for your health maintenance. Please make an appointment as soon as possible.        Sincerely,        [address] [facility]

## 2017-12-04 NOTE — ED PROVIDER NOTES
[address]  ED  eMERGENCY dEPARTMENT eNCOUnter   Attending Attestation     Pt Name: Petrona Reyes  MRN: [id number]  [place] 1995  Date of evaluation: 12/4/17       Petrona Reyes is a 25 y.o. female who presents with Emesis During Pregnancy      History: Pt presents with Vomiting during pregnancy and abdominal cramping that has been going on for weeks. No other complaints, aside from dehydration. Exam: mild abdominal tenderness, no rigidity, no swelling, no guarding, no masses. Fundus is at 20 weeks (umbilicus). Will get basic workup and fluids. Will contact OB. Anticipate discharge. I performed a history and physical examination of the patient and discussed management with the resident. I reviewed the residents note and agree with the documented findings and plan of care. Any areas of disagreement are noted on the chart. I was personally present for the key portions of any procedures. I have documented in the chart those procedures where I was not present during the key portions. I have personally reviewed all images and agree with the resident's interpretation. I have reviewed the emergency nurses triage note. I agree with the chief complaint, past medical history, past surgical history, allergies, medications, social and family history as documented unless otherwise noted below. Documentation of the HPI, Physical Exam and Medical Decision Making performed by medical students or scribes is based on my personal performance of the HPI, PE and MDM. For Phys Assistant/ Nurse Practitioner cases/documentation I have had a face to face evaluation of this patient and have completed at least one if not all key elements of the E/M (history, physical exam, and MDM). Additional findings are as noted.     For APC cases I have personally evaluated and examined the patient in conjunction with the APC and agree with the treatment plan and disposition of the patient as recorded by the Chester Vela MD  Attending Emergency  Physician        Krystle Vargas MD  12/04/17 7405

## 2017-12-04 NOTE — ED PROVIDER NOTES
[facility]  ED  Emergency Department Encounter  Mid Level Provider     Pt Name: Ivan Allen  MRN: [id number]  [place] 1995  Date of evaluation: 17  PCP:  . None (Inactive)    CHIEF COMPLAINT       Chief Complaint   Patient presents with    Emesis During Pregnancy       HISTORY OF PRESENT ILLNESS  (Location/Symptom, Timing/Onset, Context/Setting, Quality, Duration, Modifying Factors, Severity.)      Ivan Allen is a 25 y.o. female who presents with Abdominal pain, nausea vomiting,Vaginal discharge. Patient states that she is currently pregnant but she has no idea how far along she is. Patient states that she was seen in the emergency department and either September or October and told that she was pregnant. She states that she was planning on getting an  but then didn't get an  and then her sister  and her nephew  and she just hasn't had a chance to make an appointment with an obstetrician. Patient is . She does report that she is having suprapubic and right-sided abdominal pain. She states that the right-sided abdominal pain has been present since September as well as nausea and vomiting which she has had throughout her pregnancy which got worse over the past week. She states for the past 2 days she has not been able to keep anything down but she is unsure if it is because her nephew  2 days ago and she has felt very sad. Patient does report dysuria or urgency and frequency as well as white vaginal discharge. Patient states that she has not had sexual intercourse since September. She is unsure of her testing results from her previous visits. She denies any vaginal bleeding or leakage. She denies any cough, chest pain. She states that she is having a lot of difficulty getting comfortable at night. She feels as though she may be having some [place] contractions as well however she does not feel the baby move.     PAST MEDICAL / SURGICAL / SOCIAL / FAMILY HISTORY      has a past medical history of No current problems or disability. No previous surgery    Social History     Social History    Marital status: Single     Spouse name: N/A    Number of children: N/A    Years of education: N/A     Occupational History    student      Social History Main Topics    Smoking status: Current Every Day Smoker     Types: Cigars     Last attempt to quit: 9/10/2014    Smokeless tobacco: Never Used    Alcohol use No    Drug use:      Types: Marijuana      Comment: twice daily    Sexual activity: Yes     Partners: Male      Comment: Depo in the past, uses condoms infrequently     Other Topics Concern    Not on file     Social History Narrative    School: [id number] [place] [address]. with: Aunt           Family History   Problem Relation Age of Onset    Heart Defect Mother      \"Hole in the heart\" as a child    Seizures Sister     Heart Defect Maternal Aunt      \"Hole in the heart,\" unsure of dx, surgically repaired     Breast Cancer Neg Hx     Uterine Cancer Neg Hx     Ovarian Cancer Neg Hx     Colon Cancer Neg Hx        Allergies:  Review of patient's allergies indicates no known allergies. Home Medications:  Prior to Admission medications    Medication Sig Start Date End Date Taking? Authorizing Provider   cephALEXin (KEFLEX) 500 MG capsule Take 1 capsule by mouth 2 times daily for 7 days 12/4/17 12/11/17 Yes Art Layton PA-C   metroNIDAZOLE (FLAGYL) 500 MG tablet Take 1 tablet by mouth 2 times daily for 7 days 12/4/17 12/11/17 Yes Rachelle Saldana PA-C   doxylamine-pyridoxine (DICLEGIS) 10-10 MG TBEC Take 2 tablets by mouth nightly Please take 2 tablets at bedtime.   If your symptoms persist after 2 days please increase to 1 tablet in the morning and 2 tablets at bedtime 9/14/17   Art Layton PA-C   Prenatal Vit-Fe Fumarate-FA (PRENATAL VITAMINS) 28-0.8 MG TABS Take 1 tablet by mouth daily 9/14/17   Art Layton PA-C   famotidine (PEPCID) 20 MG tablet Take 1 tablet by mouth 2 times daily 2/28/17   Ga Wood MD PhD   EPINEPHrine (EPIPEN 2-JEAN) 0.3 MG/0.3ML SOAJ injection Inject 0.3 mLs into the muscle once for 1 dose Use as directed for allergic reaction 2/28/17 2/28/17  Ga Wood MD PhD       patient's medication list has been reviewed as entered by the nursing staff. REVIEW OF SYSTEMS    (2-9 systems for level 4, 10 or more for level 5)      Review of Systems   Constitutional: Positive for chills. Negative for fever. HENT: Negative for ear pain, rhinorrhea and sore throat. Eyes: Negative for pain, discharge and itching. Respiratory: Negative for cough and wheezing. Cardiovascular: Negative for chest pain and palpitations. Gastrointestinal: Positive for abdominal pain, nausea and vomiting. Negative for diarrhea. Genitourinary: Positive for dysuria, frequency and urgency. Negative for difficulty urinating, vaginal bleeding and vaginal discharge. Musculoskeletal: Negative for back pain and myalgias. Skin: Negative for color change and wound. Neurological: Negative for dizziness and headaches. Psychiatric/Behavioral: Negative for dysphoric mood. PHYSICAL EXAM   (up to 7 for level 4, 8 or more for level 5)      INITIAL VITALS:  height is 5' 7\" (1.702 m). Her oral temperature is 97.7 °F (36.5 °C). Her blood pressure is 118/75 and her pulse is 95. Her respiration is 17 and oxygen saturation is 100%. Physical Exam   Constitutional: She is oriented to person, place, and time. She appears well-developed and well-nourished. HENT:   Head: Normocephalic and atraumatic. Right Ear: External ear normal.   Left Ear: External ear normal.   Mucous membranes are dry   Eyes: Right eye exhibits no discharge. Left eye exhibits no discharge. No scleral icterus. Neck: Normal range of motion. No tracheal deviation present. Cardiovascular: Normal rate, regular rhythm and normal heart sounds.   Exam reveals no DIAGNOSTIC RESULTS / EMERGENCY DEPARTMENT COURSE / MDM   Patient with minimal tenderness on physical exam.  No appendicitis would be within the differential diagnosis patient states that she has had the exact same pain since September. On review of the medical records it does appear that she had an ultrasound done during her visit in September which would make her 19 weeks and 4 days pregnant. Patient has had no prenatal care. I did speak with the obstetrics residents prior to doing the pelvic examination and they do not want to do it and say that it's okay to do at this time. Patient is not having any vaginal bleeding. She was positive for gonorrhea 2 previous visits and was not treated at the last visit in October so we will treat her today. I did encourage her to complete the Flagyl as she was positive for Trichomonas and bacterial vaginosis as well. We will treat her for urinary tract infection with the urine going for culture. Patient has an appointment scheduled by the  with the gynecology obstetrics clinic and I've encouraged her to attend that appointment for further treatment and for healthy pregnancy. Patient states that she does have Dicegis home that she can take and also reports that she has prenatal vitamins at home to take. RADIOLOGY:   I directly visualized (with the attending physician) the following  images and reviewed the radiologist interpretations:  No results found. No orders to display       LABS:  Results for orders placed or performed during the hospital encounter of 12/04/17   VAGINITIS DNA PROBE   Result Value Ref Range    Specimen Description . VAGINA     Special Requests NOT REPORTED     Direct Exam POSITIVE for Gardnerella vaginalis. (A)     Direct Exam POSITIVE for Trichomonas vaginalis (A)     Direct Exam NEGATIVE for Candida sp.      Direct Exam       Method of testing is a DNA probe intended for detection and identification of    Direct Exam DISPOSITION Decision To Discharge    PATIENT REFERRED TO:  [facility] [address] [address] [phone]  Schedule an appointment as soon as possible for a visit         DISCHARGE MEDICATIONS:  Discharge Medication List as of 12/4/2017  3:22 PM      START taking these medications    Details   cephALEXin (KEFLEX) 500 MG capsule Take 1 capsule by mouth 2 times daily for 7 days, Disp-14 capsule, R-0Print      metroNIDAZOLE (FLAGYL) 500 MG tablet Take 1 tablet by mouth 2 times daily for 7 days, Disp-14 tablet, R-0Print             Loreta Salinas PA-C   Emergency Medicine Physician Assistant    (Please note that portions of this note were completed with a voice recognition program.  Efforts were made to edit the dictations but occasionally words are mis-transcribed.)       Loreta Salinas PA-C  12/04/17 [id number]

## 2017-12-04 NOTE — ED TRIAGE NOTES
Pt to ED c/o n/v x2 days and lower abdominal cramping. Pt states that she has not been able to tolerate anything PO at home. Pt states that she also believes that she has a UTI. Pt states that she has not seen an OB during this pregnancy because she was going to initially get an  but got delayed when her sister . Pt states that she is now not getting an  because she thinks she is too far along. Pt states she believes that her LMP was sometime in the beginning of September.

## 2017-12-29 ENCOUNTER — HOSPITAL ENCOUNTER (OUTPATIENT)
Age: 22
Discharge: HOME | End: 2017-12-29
Attending: OBSTETRICS & GYNECOLOGY | Admitting: OBSTETRICS & GYNECOLOGY
Payer: MEDICARE

## 2017-12-29 VITALS
HEART RATE: 77 BPM | SYSTOLIC BLOOD PRESSURE: 111 MMHG | TEMPERATURE: 97.9 F | HEIGHT: 67 IN | DIASTOLIC BLOOD PRESSURE: 61 MMHG | RESPIRATION RATE: 16 BRPM

## 2017-12-29 PROBLEM — O09.899 HISTORY OF GBS (GROUP B STREPTOCOCCUS) UTI, CURRENTLY PREGNANT: Status: ACTIVE | Noted: 2017-12-29

## 2017-12-29 PROBLEM — Z87.440 HISTORY OF GBS (GROUP B STREPTOCOCCUS) UTI, CURRENTLY PREGNANT: Status: ACTIVE | Noted: 2017-12-29

## 2017-12-29 PROBLEM — O09.90 HIGH RISK PREGNANCY, ANTEPARTUM: Status: ACTIVE | Noted: 2017-12-29

## 2017-12-29 LAB
-: NORMAL
AMORPHOUS: NORMAL
BACTERIA: NORMAL
BILIRUBIN URINE: NEGATIVE
C TRACH DNA GENITAL QL NAA+PROBE: NEGATIVE
CASTS UA: NORMAL /LPF (ref 0–8)
COLOR: YELLOW
COMMENT UA: ABNORMAL
CRYSTALS, UA: NORMAL /HPF
DIRECT EXAM: NORMAL
EPITHELIAL CELLS UA: NORMAL /HPF (ref 0–5)
GLUCOSE URINE: NEGATIVE
KETONES, URINE: NEGATIVE
LEUKOCYTE ESTERASE, URINE: ABNORMAL
Lab: NORMAL
MUCUS: NORMAL
N. GONORRHOEAE DNA: NEGATIVE
NITRITE, URINE: NEGATIVE
OTHER OBSERVATIONS UA: NORMAL
PH UA: 6.5 (ref 5–8)
PROTEIN UA: NEGATIVE
RBC UA: NORMAL /HPF (ref 0–4)
RENAL EPITHELIAL, UA: NORMAL /HPF
SPECIFIC GRAVITY UA: 1.02 (ref 1–1.03)
SPECIMEN DESCRIPTION: NORMAL
STATUS: NORMAL
TRICHOMONAS: NORMAL
TURBIDITY: CLEAR
URINE HGB: NEGATIVE
UROBILINOGEN, URINE: NORMAL
WBC UA: NORMAL /HPF (ref 0–5)
YEAST: NORMAL

## 2017-12-29 PROCEDURE — 99213 OFFICE O/P EST LOW 20 MIN: CPT

## 2017-12-29 PROCEDURE — 87591 N.GONORRHOEAE DNA AMP PROB: CPT

## 2017-12-29 PROCEDURE — 87491 CHLMYD TRACH DNA AMP PROBE: CPT

## 2017-12-29 PROCEDURE — 96360 HYDRATION IV INFUSION INIT: CPT

## 2017-12-29 PROCEDURE — 87086 URINE CULTURE/COLONY COUNT: CPT

## 2017-12-29 PROCEDURE — 6370000000 HC RX 637 (ALT 250 FOR IP): Performed by: STUDENT IN AN ORGANIZED HEALTH CARE EDUCATION/TRAINING PROGRAM

## 2017-12-29 PROCEDURE — 6360000002 HC RX W HCPCS: Performed by: STUDENT IN AN ORGANIZED HEALTH CARE EDUCATION/TRAINING PROGRAM

## 2017-12-29 PROCEDURE — 87510 GARDNER VAG DNA DIR PROBE: CPT

## 2017-12-29 PROCEDURE — 81001 URINALYSIS AUTO W/SCOPE: CPT

## 2017-12-29 PROCEDURE — 87660 TRICHOMONAS VAGIN DIR PROBE: CPT

## 2017-12-29 PROCEDURE — 2580000003 HC RX 258: Performed by: STUDENT IN AN ORGANIZED HEALTH CARE EDUCATION/TRAINING PROGRAM

## 2017-12-29 PROCEDURE — 87480 CANDIDA DNA DIR PROBE: CPT

## 2017-12-29 RX ORDER — ONDANSETRON 4 MG/1
4 TABLET, ORALLY DISINTEGRATING ORAL EVERY 8 HOURS PRN
Qty: 15 TABLET | Refills: 0 | Status: SHIPPED | OUTPATIENT
Start: 2017-12-29 | End: 2018-01-18 | Stop reason: ALTCHOICE

## 2017-12-29 RX ORDER — 0.9 % SODIUM CHLORIDE 0.9 %
1000 INTRAVENOUS SOLUTION INTRAVENOUS ONCE
Status: COMPLETED | OUTPATIENT
Start: 2017-12-29 | End: 2017-12-29

## 2017-12-29 RX ORDER — CEPHALEXIN 500 MG/1
500 CAPSULE ORAL 4 TIMES DAILY
Qty: 40 CAPSULE | Refills: 0 | Status: SHIPPED | OUTPATIENT
Start: 2017-12-29 | End: 2018-01-08

## 2017-12-29 RX ORDER — ONDANSETRON 2 MG/ML
4 INJECTION INTRAMUSCULAR; INTRAVENOUS EVERY 6 HOURS PRN
Status: DISCONTINUED | OUTPATIENT
Start: 2017-12-29 | End: 2017-12-29

## 2017-12-29 RX ORDER — ONDANSETRON 4 MG/1
4 TABLET, FILM COATED ORAL ONCE
Status: COMPLETED | OUTPATIENT
Start: 2017-12-29 | End: 2017-12-29

## 2017-12-29 RX ORDER — ONDANSETRON 2 MG/ML
4 INJECTION INTRAMUSCULAR; INTRAVENOUS EVERY 6 HOURS PRN
Status: DISCONTINUED | OUTPATIENT
Start: 2017-12-29 | End: 2017-12-29 | Stop reason: HOSPADM

## 2017-12-29 RX ORDER — ACETAMINOPHEN 500 MG
1000 TABLET ORAL EVERY 6 HOURS PRN
Status: DISCONTINUED | OUTPATIENT
Start: 2017-12-29 | End: 2017-12-29 | Stop reason: HOSPADM

## 2017-12-29 RX ADMIN — SODIUM CHLORIDE 1000 ML: 9 INJECTION, SOLUTION INTRAVENOUS at 02:35

## 2017-12-29 RX ADMIN — ACETAMINOPHEN 1000 MG: 500 TABLET ORAL at 01:06

## 2017-12-29 RX ADMIN — ONDANSETRON HYDROCHLORIDE 4 MG: 4 TABLET, FILM COATED ORAL at 01:06

## 2017-12-29 RX ADMIN — ONDANSETRON 4 MG: 2 INJECTION INTRAMUSCULAR; INTRAVENOUS at 02:35

## 2017-12-29 ASSESSMENT — PAIN SCALES - GENERAL: PAINLEVEL_OUTOF10: 3

## 2017-12-29 NOTE — PROGRESS NOTES
Resident Interval Note    Patient failed PO challenge with crackers and PO Zofran. Vitals:    12/29/17 0030 12/29/17 0033   BP: 111/61    Pulse: 77    Resp: 16    Temp:  97.9 °F (36.6 °C)   TempSrc:  Oral   Height:  5' 7\" (1.702 m)     Results for orders placed or performed during the hospital encounter of 12/29/17   VAGINITIS DNA PROBE   Result Value Ref Range    Specimen Description . VAGINA     Special Requests NOT REPORTED     Direct Exam NEGATIVE for Candida sp. Direct Exam NEGATIVE for Gardnerella vaginalis     Direct Exam NEGATIVE for Trichomonas vaginalis     Direct Exam       Method of testing is a DNA probe intended for detection and identification of    Direct Exam        Candida species, Gardnerella vaginalis, and Trichomonas vaginalis nucleic acid    Direct Exam        in vaginal fluid specimens from patients with symptoms of vaginitis/vaginosis.     Direct Exam       [facility] [address] [address] [phone]    Status FINAL 12/29/2017    UA W/REFLEX CULTURE   Result Value Ref Range    Color, UA YELLOW YEL    Turbidity UA CLEAR CLEAR    Glucose, Ur NEGATIVE NEG    Bilirubin Urine NEGATIVE NEG    Ketones, Urine NEGATIVE NEG    Specific Gravity, UA 1.020 1.005 - 1.030    Urine Hgb NEGATIVE NEG    pH, UA 6.5 5.0 - 8.0    Protein, UA NEGATIVE NEG    Urobilinogen, Urine Normal NORM    Nitrite, Urine NEGATIVE NEG    Leukocyte Esterase, Urine MODERATE (A) NEG    Urinalysis Comments NOT REPORTED    Microscopic Urinalysis   Result Value Ref Range    -          WBC, UA 50  0 - 5 /HPF    RBC, UA 2 TO 5 0 - 4 /HPF    Casts UA 50  0 - 8 /LPF    Crystals UA NOT REPORTED NONE /HPF    Epithelial Cells UA 0 TO 2 0 - 5 /HPF    Renal Epithelial, Urine NOT REPORTED 0 /HPF    Bacteria, UA NOT REPORTED NONE    Mucus, UA NOT REPORTED NONE    Trichomonas, UA NOT REPORTED NONE    Amorphous, UA NOT REPORTED NONE    Other Observations UA NOT REPORTED NREQ    Yeast, UA NOT REPORTED NONE

## 2017-12-29 NOTE — FLOWSHEET NOTE
Patient presents to LD via w/c from ER with c/o lower abdominal pain and dehydration, patient also requests STD check stating she just wants to be safe. Patient denies any vaginal bleeding or LOF. Patient reports +FM.   notified of arrival.
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
## 2017-12-30 LAB
CULTURE: NO GROWTH
CULTURE: NORMAL
Lab: NORMAL
SPECIMEN DESCRIPTION: NORMAL
STATUS: NORMAL

## 2018-01-16 ENCOUNTER — HOSPITAL ENCOUNTER (OUTPATIENT)
Age: 23
Discharge: HOME | End: 2018-01-16
Attending: OBSTETRICS & GYNECOLOGY | Admitting: OBSTETRICS & GYNECOLOGY
Payer: MEDICARE

## 2018-01-16 VITALS
DIASTOLIC BLOOD PRESSURE: 62 MMHG | RESPIRATION RATE: 16 BRPM | HEART RATE: 74 BPM | TEMPERATURE: 97.5 F | SYSTOLIC BLOOD PRESSURE: 107 MMHG

## 2018-01-16 PROBLEM — O09.92 HRP (HIGH RISK PREGNANCY), SECOND TRIMESTER: Status: ACTIVE | Noted: 2018-01-16

## 2018-01-16 LAB
-: NORMAL
ABO/RH: NORMAL
ABSOLUTE EOS #: 0.03 K/UL (ref 0–0.44)
ABSOLUTE IMMATURE GRANULOCYTE: 0.05 K/UL (ref 0–0.3)
ABSOLUTE LYMPH #: 1.74 K/UL (ref 1.1–3.7)
ABSOLUTE MONO #: 0.57 K/UL (ref 0.1–1.4)
ALBUMIN SERPL-MCNC: 3.2 G/DL (ref 3.5–5.2)
ALBUMIN/GLOBULIN RATIO: 1.1 (ref 1–2.5)
ALP BLD-CCNC: 48 U/L (ref 35–104)
ALT SERPL-CCNC: <5 U/L (ref 5–33)
AMORPHOUS: NORMAL
ANION GAP SERPL CALCULATED.3IONS-SCNC: 13 MMOL/L (ref 9–17)
ANTIBODY SCREEN: NEGATIVE
AST SERPL-CCNC: 11 U/L
BACTERIA: NORMAL
BASOPHILS # BLD: 0 % (ref 0–2)
BASOPHILS ABSOLUTE: <0.03 K/UL (ref 0–0.2)
BILIRUB SERPL-MCNC: 0.44 MG/DL (ref 0.3–1.2)
BILIRUBIN URINE: NEGATIVE
BUN BLDV-MCNC: 7 MG/DL (ref 6–20)
BUN/CREAT BLD: ABNORMAL (ref 9–20)
CALCIUM SERPL-MCNC: 8 MG/DL (ref 8.6–10.4)
CASTS UA: NORMAL /LPF (ref 0–8)
CHLORIDE BLD-SCNC: 99 MMOL/L (ref 98–107)
CO2: 21 MMOL/L (ref 20–31)
COLOR: YELLOW
COMMENT UA: ABNORMAL
CREAT SERPL-MCNC: 0.39 MG/DL (ref 0.5–0.9)
CRYSTALS, UA: NORMAL /HPF
DIFFERENTIAL TYPE: ABNORMAL
DIRECT EXAM: NORMAL
EOSINOPHILS RELATIVE PERCENT: 0 % (ref 1–4)
EPITHELIAL CELLS UA: NORMAL /HPF (ref 0–5)
GFR AFRICAN AMERICAN: >60 ML/MIN
GFR NON-AFRICAN AMERICAN: >60 ML/MIN
GFR SERPL CREATININE-BSD FRML MDRD: ABNORMAL ML/MIN/{1.73_M2}
GFR SERPL CREATININE-BSD FRML MDRD: ABNORMAL ML/MIN/{1.73_M2}
GLUCOSE BLD-MCNC: 73 MG/DL (ref 70–99)
GLUCOSE URINE: NEGATIVE
HCT VFR BLD CALC: 28.9 % (ref 36.3–47.1)
HEMOGLOBIN: 9.4 G/DL (ref 11.9–15.1)
HEPATITIS B SURFACE ANTIGEN: NONREACTIVE
HIV AG/AB: NONREACTIVE
IMMATURE GRANULOCYTES: 1 %
KETONES, URINE: NEGATIVE
LEUKOCYTE ESTERASE, URINE: ABNORMAL
LYMPHOCYTES # BLD: 25 % (ref 25–45)
Lab: NORMAL
MCH RBC QN AUTO: 32.1 PG (ref 25.2–33.5)
MCHC RBC AUTO-ENTMCNC: 32.5 G/DL (ref 28.4–34.8)
MCV RBC AUTO: 98.6 FL (ref 82.6–102.9)
MONOCYTES # BLD: 8 % (ref 2–8)
MUCUS: NORMAL
NITRITE, URINE: NEGATIVE
NRBC AUTOMATED: 0 PER 100 WBC
OTHER OBSERVATIONS UA: NORMAL
PDW BLD-RTO: 13.2 % (ref 11.8–14.4)
PH UA: >9 (ref 5–8)
PLATELET # BLD: 190 K/UL (ref 138–453)
PLATELET ESTIMATE: ABNORMAL
PMV BLD AUTO: 10.2 FL (ref 8.1–13.5)
POTASSIUM SERPL-SCNC: 3.6 MMOL/L (ref 3.7–5.3)
PROTEIN UA: NEGATIVE
RBC # BLD: 2.93 M/UL (ref 3.95–5.11)
RBC # BLD: ABNORMAL 10*6/UL
RBC UA: NORMAL /HPF (ref 0–4)
RENAL EPITHELIAL, UA: NORMAL /HPF
RUBV IGG SER QL: 91.9 IU/ML
SEG NEUTROPHILS: 66 % (ref 34–64)
SEGMENTED NEUTROPHILS ABSOLUTE COUNT: 4.59 K/UL (ref 1.5–8.1)
SODIUM BLD-SCNC: 133 MMOL/L (ref 135–144)
SPECIFIC GRAVITY UA: 1.02 (ref 1–1.03)
SPECIMEN DESCRIPTION: NORMAL
STATUS: NORMAL
T. PALLIDUM, IGG: NONREACTIVE
TOTAL PROTEIN: 6.1 G/DL (ref 6.4–8.3)
TRICHOMONAS: NORMAL
TURBIDITY: CLEAR
URINE HGB: NEGATIVE
UROBILINOGEN, URINE: NORMAL
WBC # BLD: 7 K/UL (ref 4.5–13.5)
WBC # BLD: ABNORMAL 10*3/UL
WBC UA: NORMAL /HPF (ref 0–5)
YEAST: NORMAL

## 2018-01-16 PROCEDURE — 6360000002 HC RX W HCPCS: Performed by: STUDENT IN AN ORGANIZED HEALTH CARE EDUCATION/TRAINING PROGRAM

## 2018-01-16 PROCEDURE — 99213 OFFICE O/P EST LOW 20 MIN: CPT

## 2018-01-16 PROCEDURE — 36415 COLL VENOUS BLD VENIPUNCTURE: CPT

## 2018-01-16 PROCEDURE — 86900 BLOOD TYPING SEROLOGIC ABO: CPT

## 2018-01-16 PROCEDURE — 87086 URINE CULTURE/COLONY COUNT: CPT

## 2018-01-16 PROCEDURE — 80053 COMPREHEN METABOLIC PANEL: CPT

## 2018-01-16 PROCEDURE — 87340 HEPATITIS B SURFACE AG IA: CPT

## 2018-01-16 PROCEDURE — 86780 TREPONEMA PALLIDUM: CPT

## 2018-01-16 PROCEDURE — 87804 INFLUENZA ASSAY W/OPTIC: CPT

## 2018-01-16 PROCEDURE — 86850 RBC ANTIBODY SCREEN: CPT

## 2018-01-16 PROCEDURE — 85025 COMPLETE CBC W/AUTO DIFF WBC: CPT

## 2018-01-16 PROCEDURE — 99234 HOSP IP/OBS SM DT SF/LOW 45: CPT | Performed by: OBSTETRICS & GYNECOLOGY

## 2018-01-16 PROCEDURE — 87389 HIV-1 AG W/HIV-1&-2 AB AG IA: CPT

## 2018-01-16 PROCEDURE — 86901 BLOOD TYPING SEROLOGIC RH(D): CPT

## 2018-01-16 PROCEDURE — 86762 RUBELLA ANTIBODY: CPT

## 2018-01-16 PROCEDURE — 81001 URINALYSIS AUTO W/SCOPE: CPT

## 2018-01-16 RX ORDER — ONDANSETRON 4 MG/1
4 TABLET, FILM COATED ORAL EVERY 8 HOURS PRN
Status: DISCONTINUED | OUTPATIENT
Start: 2018-01-16 | End: 2018-01-17 | Stop reason: HOSPADM

## 2018-01-16 RX ORDER — PROMETHAZINE HYDROCHLORIDE 25 MG/ML
12.5 INJECTION, SOLUTION INTRAMUSCULAR; INTRAVENOUS EVERY 4 HOURS PRN
Status: DISCONTINUED | OUTPATIENT
Start: 2018-01-16 | End: 2018-01-17 | Stop reason: HOSPADM

## 2018-01-16 RX ORDER — PROMETHAZINE HYDROCHLORIDE 25 MG/1
25 TABLET ORAL EVERY 6 HOURS PRN
Qty: 10 TABLET | Refills: 0 | Status: SHIPPED | OUTPATIENT
Start: 2018-01-16 | End: 2018-01-18 | Stop reason: ALTCHOICE

## 2018-01-16 RX ADMIN — PROMETHAZINE HYDROCHLORIDE 12.5 MG: 25 INJECTION INTRAMUSCULAR; INTRAVENOUS at 21:01

## 2018-01-16 RX ADMIN — ONDANSETRON 4 MG: 4 TABLET, FILM COATED ORAL at 18:22

## 2018-01-16 NOTE — H&P
dose Use as directed for allergic reaction 17  Salud Merida MD PhD       FAMILY HISTORY:  family history includes Heart Defect in her maternal aunt and mother; Seizures in her sister. SOCIAL HISTORY:   reports that she has been smoking Cigars. She has never used smokeless tobacco. She reports that she uses drugs, including Marijuana. She reports that she does not drink alcohol. VITALS:  Vitals:    18 1733   BP: 107/62   Pulse: 74   Resp: 16   Temp: 97.5 °F (36.4 °C)       PHYSICAL EXAM:  Fetal Heart Monitor:  Baseline Heart Rate 140, appropriate for GA   Rolfe: contractions, none    General appearance:  no apparent distress, alert and cooperative  Neurologic:  alert, oriented, normal speech, no focal findings or movement disorder noted  Lungs:  No increased work of breathing, good air exchange, clear to auscultation bilaterally, no crackles or wheezing  Heart:  regular rate and rhythm and no murmur    Abdomen:  soft, gravid, non-tender, no right upper quadrant tenderness, no CVA tenderness, uterus non-tender, no signs of abruption and no signs of chorioamnionitis  Abdominal Scars: absent  Extremities:  no calf tenderness, non edematous  Pelvic Exam: Not indicated     PRENATAL LAB RESULTS:   Blood Type/Rh: O pos  Antibody Screen: negative  Hemoglobin, Hematocrit, Platelets: 60.6 / 56.9 / 226  Prenatal labs pending    ASSESSMENT & PLAN:  Molly Duff is a 25 y.o. female  at 25w10d with nausea, vomiting, and fatigue for 2-3 days.   - GBS positive / Rh positive / R unknown   - No indication for GBS prophylaxis   - VSS    Nausea and Vomiting   - UA, CMP pending   - Zofran/Phenergan PRN   - PO challenge    Insufficient prenatal care   - Prenatal labs collected and pending    GBS Bacteriuria   - Keflex Rx given 10/2/17        UDS ordered, Informed consent obtained. Discussed R/B/A. All questions and concerns answered. Patient verbalized understanding and agreement to plan.        Patient

## 2018-01-17 LAB
CULTURE: NORMAL
CULTURE: NORMAL
Lab: NORMAL
SPECIMEN DESCRIPTION: NORMAL
STATUS: NORMAL

## 2018-01-18 ENCOUNTER — HOSPITAL ENCOUNTER (OUTPATIENT)
Age: 23
Setting detail: SPECIMEN
Discharge: HOME | End: 2018-01-18
Payer: MEDICARE

## 2018-01-18 ENCOUNTER — INITIAL PRENATAL (OUTPATIENT)
Dept: OBGYN | Age: 23
End: 2018-01-18
Payer: MEDICARE

## 2018-01-18 VITALS
WEIGHT: 153 LBS | BODY MASS INDEX: 23.96 KG/M2 | SYSTOLIC BLOOD PRESSURE: 116 MMHG | HEART RATE: 98 BPM | DIASTOLIC BLOOD PRESSURE: 71 MMHG

## 2018-01-18 DIAGNOSIS — O09.92 HRP (HIGH RISK PREGNANCY), SECOND TRIMESTER: ICD-10-CM

## 2018-01-18 DIAGNOSIS — F12.10 MILD TETRAHYDROCANNABINOL (THC) ABUSE: ICD-10-CM

## 2018-01-18 DIAGNOSIS — O09.32 LATE PRENATAL CARE AFFECTING PREGNANCY IN SECOND TRIMESTER: ICD-10-CM

## 2018-01-18 DIAGNOSIS — O09.92 HIGH-RISK PREGNANCY, SECOND TRIMESTER: Primary | ICD-10-CM

## 2018-01-18 DIAGNOSIS — F17.200 SMOKER: ICD-10-CM

## 2018-01-18 DIAGNOSIS — Z82.79 FAMILY HISTORY OF CONGENITAL HEART DISEASE: ICD-10-CM

## 2018-01-18 DIAGNOSIS — O09.92 HIGH-RISK PREGNANCY, SECOND TRIMESTER: ICD-10-CM

## 2018-01-18 LAB
AMPHETAMINE SCREEN URINE: NEGATIVE
BARBITURATE SCREEN URINE: NEGATIVE
BENZODIAZEPINE SCREEN, URINE: NEGATIVE
BUPRENORPHINE URINE: ABNORMAL
CANNABINOID SCREEN URINE: POSITIVE
COCAINE METABOLITE, URINE: NEGATIVE
GLUCOSE ADMINISTRATION: NORMAL
GLUCOSE TOLERANCE SCREEN 50G: 73 MG/DL (ref 70–135)
MDMA URINE: ABNORMAL
METHADONE SCREEN, URINE: NEGATIVE
METHAMPHETAMINE, URINE: ABNORMAL
OPIATES, URINE: NEGATIVE
OXYCODONE SCREEN URINE: NEGATIVE
PHENCYCLIDINE, URINE: NEGATIVE
PROPOXYPHENE, URINE: ABNORMAL
TEST INFORMATION: ABNORMAL
TRICYCLIC ANTIDEPRESSANTS, UR: ABNORMAL

## 2018-01-18 PROCEDURE — 99211 OFF/OP EST MAY X REQ PHY/QHP: CPT | Performed by: OBSTETRICS & GYNECOLOGY

## 2018-01-18 PROCEDURE — 80307 DRUG TEST PRSMV CHEM ANLYZR: CPT

## 2018-01-18 PROCEDURE — G8420 CALC BMI NORM PARAMETERS: HCPCS | Performed by: OBSTETRICS & GYNECOLOGY

## 2018-01-18 PROCEDURE — 86787 VARICELLA-ZOSTER ANTIBODY: CPT

## 2018-01-18 PROCEDURE — G8428 CUR MEDS NOT DOCUMENT: HCPCS | Performed by: OBSTETRICS & GYNECOLOGY

## 2018-01-18 PROCEDURE — 36415 COLL VENOUS BLD VENIPUNCTURE: CPT

## 2018-01-18 PROCEDURE — 82950 GLUCOSE TEST: CPT

## 2018-01-18 RX ORDER — IBUPROFEN 200 MG
1 TABLET ORAL DAILY
Qty: 30 TABLET | Refills: 6 | Status: SHIPPED | OUTPATIENT
Start: 2018-01-18 | End: 2019-07-10 | Stop reason: SDUPTHER

## 2018-01-18 NOTE — PROGRESS NOTES
Prenatal Labs given to pt today- yes  Smoker- yes  BMI- rec wt gain 25-35 #  Substance Abuse History- THC   Depression/Anxiety History- no  Domestic Abuse History- no  Dental Care- last visit   Reviewed how to reach Ob/Gyn Resident afterhours-  Pt verb understanding

## 2018-01-19 ENCOUNTER — TELEPHONE (OUTPATIENT)
Dept: INTERNAL MEDICINE CLINIC | Age: 23
End: 2018-01-19

## 2018-01-19 LAB — VZV IGG SER QL IA: 2.22

## 2018-01-19 NOTE — TELEPHONE ENCOUNTER
Enrolled 1/18/19 into [employer] program. I called to remind pt that she needs to make a IM appointment and appointment with Dr. Sabra Tran. Aunt answered the phone and wanted me to leave info on answering machine, when I called back I could not leave a appointment.

## 2018-01-22 ENCOUNTER — INITIAL PRENATAL (OUTPATIENT)
Dept: OBGYN | Age: 23
End: 2018-01-22
Payer: MEDICARE

## 2018-01-22 ENCOUNTER — TELEPHONE (OUTPATIENT)
Dept: INTERNAL MEDICINE | Age: 23
End: 2018-01-22

## 2018-01-22 ENCOUNTER — HOSPITAL ENCOUNTER (OUTPATIENT)
Age: 23
Setting detail: SPECIMEN
Discharge: HOME | End: 2018-01-22
Payer: MEDICARE

## 2018-01-22 VITALS
DIASTOLIC BLOOD PRESSURE: 63 MMHG | SYSTOLIC BLOOD PRESSURE: 108 MMHG | WEIGHT: 152.3 LBS | HEART RATE: 79 BPM | BODY MASS INDEX: 23.85 KG/M2

## 2018-01-22 DIAGNOSIS — F12.10 MILD TETRAHYDROCANNABINOL (THC) ABUSE: ICD-10-CM

## 2018-01-22 DIAGNOSIS — Z3A.26 26 WEEKS GESTATION OF PREGNANCY: ICD-10-CM

## 2018-01-22 DIAGNOSIS — O09.899 HISTORY OF GBS (GROUP B STREPTOCOCCUS) UTI, CURRENTLY PREGNANT: ICD-10-CM

## 2018-01-22 DIAGNOSIS — O09.92 HRP (HIGH RISK PREGNANCY), SECOND TRIMESTER: Primary | ICD-10-CM

## 2018-01-22 DIAGNOSIS — Z87.440 HISTORY OF GBS (GROUP B STREPTOCOCCUS) UTI, CURRENTLY PREGNANT: ICD-10-CM

## 2018-01-22 DIAGNOSIS — Z82.79 FAMILY HISTORY OF CONGENITAL HEART DISEASE: ICD-10-CM

## 2018-01-22 DIAGNOSIS — O09.32 NO PRENATAL CARE IN CURRENT PREGNANCY IN SECOND TRIMESTER: ICD-10-CM

## 2018-01-22 DIAGNOSIS — O09.32 LATE PRENATAL CARE AFFECTING PREGNANCY IN SECOND TRIMESTER: ICD-10-CM

## 2018-01-22 DIAGNOSIS — F17.200 SMOKER: ICD-10-CM

## 2018-01-22 LAB
DIRECT EXAM: NORMAL
Lab: NORMAL
SPECIMEN DESCRIPTION: NORMAL
STATUS: NORMAL

## 2018-01-22 PROCEDURE — 90471 IMMUNIZATION ADMIN: CPT | Performed by: OBSTETRICS & GYNECOLOGY

## 2018-01-22 PROCEDURE — 99213 OFFICE O/P EST LOW 20 MIN: CPT | Performed by: OBSTETRICS & GYNECOLOGY

## 2018-01-22 PROCEDURE — 90688 IIV4 VACCINE SPLT 0.5 ML IM: CPT | Performed by: OBSTETRICS & GYNECOLOGY

## 2018-01-22 NOTE — TELEPHONE ENCOUNTER
PC from patient asking to set set up appt with -- pt is not yet established at our office, she has upcoming appointment in February with Dr Neftali Banerjee, we can place referral and set her up with  then, did you want her to be seen sooner? ??

## 2018-01-23 LAB
C TRACH DNA GENITAL QL NAA+PROBE: NEGATIVE
N. GONORRHOEAE DNA: NEGATIVE

## 2018-01-30 LAB — CYTOLOGY REPORT: NORMAL

## 2018-02-07 ENCOUNTER — TELEPHONE (OUTPATIENT)
Dept: INTERNAL MEDICINE CLINIC | Age: 23
End: 2018-02-07

## 2018-02-07 NOTE — TELEPHONE ENCOUNTER
Called patient today to remind her that she has a Dental appointment 2/8/18 at [facility], a young kid answered the phone. I also called 2/5/18 and spoke with aunt she said she would give patient the message that I had called.

## 2018-02-09 ENCOUNTER — HOSPITAL ENCOUNTER (OUTPATIENT)
Age: 23
Setting detail: SPECIMEN
Discharge: HOME | End: 2018-02-09
Payer: MEDICARE

## 2018-02-09 ENCOUNTER — ROUTINE PRENATAL (OUTPATIENT)
Dept: OBGYN | Age: 23
End: 2018-02-09
Payer: MEDICARE

## 2018-02-09 ENCOUNTER — HOSPITAL ENCOUNTER (OUTPATIENT)
Age: 23
Discharge: HOME | End: 2018-02-09
Attending: OBSTETRICS & GYNECOLOGY | Admitting: OBSTETRICS & GYNECOLOGY
Payer: MEDICARE

## 2018-02-09 VITALS
BODY MASS INDEX: 24.48 KG/M2 | RESPIRATION RATE: 16 BRPM | SYSTOLIC BLOOD PRESSURE: 112 MMHG | HEIGHT: 67 IN | WEIGHT: 156 LBS | HEART RATE: 97 BPM | DIASTOLIC BLOOD PRESSURE: 65 MMHG | TEMPERATURE: 98.2 F

## 2018-02-09 VITALS
DIASTOLIC BLOOD PRESSURE: 75 MMHG | BODY MASS INDEX: 24.53 KG/M2 | SYSTOLIC BLOOD PRESSURE: 116 MMHG | WEIGHT: 156.6 LBS | HEART RATE: 92 BPM

## 2018-02-09 DIAGNOSIS — R10.9 ABDOMINAL CRAMPING AFFECTING PREGNANCY: ICD-10-CM

## 2018-02-09 DIAGNOSIS — O09.32 LATE PRENATAL CARE AFFECTING PREGNANCY IN SECOND TRIMESTER: ICD-10-CM

## 2018-02-09 DIAGNOSIS — O36.8130 DECREASED FETAL MOVEMENTS IN THIRD TRIMESTER, SINGLE OR UNSPECIFIED FETUS: ICD-10-CM

## 2018-02-09 DIAGNOSIS — O09.92 HIGH-RISK PREGNANCY IN SECOND TRIMESTER: Primary | ICD-10-CM

## 2018-02-09 DIAGNOSIS — F17.200 SMOKER: ICD-10-CM

## 2018-02-09 DIAGNOSIS — Z3A.29 29 WEEKS GESTATION OF PREGNANCY: ICD-10-CM

## 2018-02-09 DIAGNOSIS — O26.899 ABDOMINAL CRAMPING AFFECTING PREGNANCY: ICD-10-CM

## 2018-02-09 DIAGNOSIS — O09.899 HISTORY OF GBS (GROUP B STREPTOCOCCUS) UTI, CURRENTLY PREGNANT: ICD-10-CM

## 2018-02-09 DIAGNOSIS — Z82.79 FAMILY HISTORY OF CONGENITAL HEART DISEASE: ICD-10-CM

## 2018-02-09 DIAGNOSIS — Z87.440 HISTORY OF GBS (GROUP B STREPTOCOCCUS) UTI, CURRENTLY PREGNANT: ICD-10-CM

## 2018-02-09 DIAGNOSIS — F12.10 MILD TETRAHYDROCANNABINOL (THC) ABUSE: ICD-10-CM

## 2018-02-09 DIAGNOSIS — O9A.213 TRAUMATIC INJURY DURING PREGNANCY IN THIRD TRIMESTER: ICD-10-CM

## 2018-02-09 PROBLEM — O09.93 HRP (HIGH RISK PREGNANCY), THIRD TRIMESTER: Status: ACTIVE | Noted: 2018-02-09

## 2018-02-09 PROBLEM — O36.8190 DECREASED FETAL MOVEMENT: Status: ACTIVE | Noted: 2018-02-09

## 2018-02-09 LAB
-: NORMAL
AMORPHOUS: NORMAL
AMPHETAMINE SCREEN URINE: NEGATIVE
BACTERIA: NORMAL
BARBITURATE SCREEN URINE: NEGATIVE
BENZODIAZEPINE SCREEN, URINE: NEGATIVE
BILIRUBIN URINE: NEGATIVE
BUPRENORPHINE URINE: ABNORMAL
CANNABINOID SCREEN URINE: POSITIVE
CASTS UA: NORMAL /LPF (ref 0–8)
COCAINE METABOLITE, URINE: NEGATIVE
COLOR: YELLOW
COMMENT UA: ABNORMAL
CRYSTALS, UA: NORMAL /HPF
DIRECT EXAM: NORMAL
EPITHELIAL CELLS UA: NORMAL /HPF (ref 0–5)
FETAL FIBRONECTIN APPEARANCE: NORMAL
FETAL FIBRONECTIN: NEGATIVE
GLUCOSE URINE: NEGATIVE
KETONES, URINE: NEGATIVE
LEUKOCYTE ESTERASE, URINE: ABNORMAL
Lab: NORMAL
MDMA URINE: ABNORMAL
METHADONE SCREEN, URINE: NEGATIVE
METHAMPHETAMINE, URINE: ABNORMAL
MUCUS: NORMAL
NITRITE, URINE: NEGATIVE
OPIATES, URINE: NEGATIVE
OTHER OBSERVATIONS UA: NORMAL
OXYCODONE SCREEN URINE: NEGATIVE
PH UA: 6.5 (ref 5–8)
PHENCYCLIDINE, URINE: NEGATIVE
PROPOXYPHENE, URINE: ABNORMAL
PROTEIN UA: NEGATIVE
RBC UA: NORMAL /HPF (ref 0–4)
RENAL EPITHELIAL, UA: NORMAL /HPF
SPECIFIC GRAVITY UA: 1.01 (ref 1–1.03)
SPECIMEN DESCRIPTION: NORMAL
STATUS: NORMAL
TEST INFORMATION: ABNORMAL
TRICHOMONAS: NORMAL
TRICYCLIC ANTIDEPRESSANTS, UR: ABNORMAL
TURBIDITY: CLEAR
URINE HGB: NEGATIVE
UROBILINOGEN, URINE: NORMAL
WBC UA: NORMAL /HPF (ref 0–5)
YEAST: NORMAL

## 2018-02-09 PROCEDURE — 4004F PT TOBACCO SCREEN RCVD TLK: CPT | Performed by: OBSTETRICS & GYNECOLOGY

## 2018-02-09 PROCEDURE — G8484 FLU IMMUNIZE NO ADMIN: HCPCS | Performed by: OBSTETRICS & GYNECOLOGY

## 2018-02-09 PROCEDURE — 99234 HOSP IP/OBS SM DT SF/LOW 45: CPT | Performed by: OBSTETRICS & GYNECOLOGY

## 2018-02-09 PROCEDURE — 99213 OFFICE O/P EST LOW 20 MIN: CPT

## 2018-02-09 PROCEDURE — 90715 TDAP VACCINE 7 YRS/> IM: CPT | Performed by: OBSTETRICS & GYNECOLOGY

## 2018-02-09 PROCEDURE — 99213 OFFICE O/P EST LOW 20 MIN: CPT | Performed by: OBSTETRICS & GYNECOLOGY

## 2018-02-09 PROCEDURE — 81001 URINALYSIS AUTO W/SCOPE: CPT

## 2018-02-09 PROCEDURE — 87086 URINE CULTURE/COLONY COUNT: CPT

## 2018-02-09 PROCEDURE — 80307 DRUG TEST PRSMV CHEM ANLYZR: CPT

## 2018-02-09 PROCEDURE — 6370000000 HC RX 637 (ALT 250 FOR IP): Performed by: STUDENT IN AN ORGANIZED HEALTH CARE EDUCATION/TRAINING PROGRAM

## 2018-02-09 PROCEDURE — G8420 CALC BMI NORM PARAMETERS: HCPCS | Performed by: OBSTETRICS & GYNECOLOGY

## 2018-02-09 PROCEDURE — G8427 DOCREV CUR MEDS BY ELIG CLIN: HCPCS | Performed by: OBSTETRICS & GYNECOLOGY

## 2018-02-09 PROCEDURE — 90471 IMMUNIZATION ADMIN: CPT | Performed by: OBSTETRICS & GYNECOLOGY

## 2018-02-09 RX ORDER — ACETAMINOPHEN 500 MG
1000 TABLET ORAL ONCE
Status: COMPLETED | OUTPATIENT
Start: 2018-02-09 | End: 2018-02-09

## 2018-02-09 RX ADMIN — ACETAMINOPHEN 1000 MG: 500 TABLET ORAL at 12:47

## 2018-02-09 ASSESSMENT — PAIN SCALES - GENERAL: PAINLEVEL_OUTOF10: 7
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
## 2018-02-10 LAB
CULTURE: NORMAL
CULTURE: NORMAL
Lab: NORMAL
SPECIMEN DESCRIPTION: NORMAL
STATUS: NORMAL

## 2018-02-12 ENCOUNTER — TELEPHONE (OUTPATIENT)
Dept: INTERNAL MEDICINE CLINIC | Age: 23
End: 2018-02-12

## 2018-02-12 LAB
C TRACH DNA GENITAL QL NAA+PROBE: NEGATIVE
N. GONORRHOEAE DNA: NEGATIVE

## 2018-02-12 NOTE — TELEPHONE ENCOUNTER
I have been trying to contact client , I  have left messages with her Aunt and voicemail messages also at . I will  continue to try and reach out to her.

## 2018-02-14 ENCOUNTER — TELEPHONE (OUTPATIENT)
Dept: INTERNAL MEDICINE CLINIC | Age: 23
End: 2018-02-14

## 2018-02-14 NOTE — TELEPHONE ENCOUNTER
Left a voicemail for client to call me to come in for a appointment, client has not returned calls. I will also mail out letter today.

## 2018-02-15 ENCOUNTER — ROUTINE PRENATAL (OUTPATIENT)
Dept: PERINATAL CARE | Age: 23
End: 2018-02-15
Payer: MEDICARE

## 2018-02-15 VITALS
HEART RATE: 70 BPM | BODY MASS INDEX: 24.96 KG/M2 | TEMPERATURE: 97.5 F | SYSTOLIC BLOOD PRESSURE: 100 MMHG | HEIGHT: 67 IN | WEIGHT: 159 LBS | DIASTOLIC BLOOD PRESSURE: 60 MMHG | RESPIRATION RATE: 16 BRPM

## 2018-02-15 DIAGNOSIS — O35.BXX0 ANOMALY OF HEART OF FETUS AFFECTING PREGNANCY, ANTEPARTUM, SINGLE OR UNSPECIFIED FETUS: Primary | ICD-10-CM

## 2018-02-15 DIAGNOSIS — O09.33 INSUFFICIENT PRENATAL CARE IN THIRD TRIMESTER: ICD-10-CM

## 2018-02-15 DIAGNOSIS — O99.313 ALCOHOL USE AFFECTING PREGNANCY IN THIRD TRIMESTER: ICD-10-CM

## 2018-02-15 DIAGNOSIS — O35.2XX0 HEREDITARY FAMILIAL DISEASE AFFECTING MANAGEMENT OF MOTHER AND POSSIBLY AFFECTING FETUS, ANTEPARTUM, SINGLE OR UNSPECIFIED FETUS: ICD-10-CM

## 2018-02-15 DIAGNOSIS — Z3A.30 30 WEEKS GESTATION OF PREGNANCY: ICD-10-CM

## 2018-02-15 DIAGNOSIS — O36.5930 POOR FETAL GROWTH AFFECTING MANAGEMENT OF MOTHER IN THIRD TRIMESTER, SINGLE OR UNSPECIFIED FETUS: ICD-10-CM

## 2018-02-15 PROCEDURE — G8484 FLU IMMUNIZE NO ADMIN: HCPCS | Performed by: OBSTETRICS & GYNECOLOGY

## 2018-02-15 PROCEDURE — 76819 FETAL BIOPHYS PROFIL W/O NST: CPT | Performed by: OBSTETRICS & GYNECOLOGY

## 2018-02-15 PROCEDURE — 76821 MIDDLE CEREBRAL ARTERY ECHO: CPT | Performed by: OBSTETRICS & GYNECOLOGY

## 2018-02-15 PROCEDURE — 76827 ECHO EXAM OF FETAL HEART: CPT | Performed by: OBSTETRICS & GYNECOLOGY

## 2018-02-15 PROCEDURE — G8420 CALC BMI NORM PARAMETERS: HCPCS | Performed by: OBSTETRICS & GYNECOLOGY

## 2018-02-15 PROCEDURE — 76825 ECHO EXAM OF FETAL HEART: CPT | Performed by: OBSTETRICS & GYNECOLOGY

## 2018-02-15 PROCEDURE — 93325 DOPPLER ECHO COLOR FLOW MAPG: CPT | Performed by: OBSTETRICS & GYNECOLOGY

## 2018-02-15 PROCEDURE — 76811 OB US DETAILED SNGL FETUS: CPT | Performed by: OBSTETRICS & GYNECOLOGY

## 2018-02-15 PROCEDURE — 76820 UMBILICAL ARTERY ECHO: CPT | Performed by: OBSTETRICS & GYNECOLOGY

## 2018-02-15 PROCEDURE — G8427 DOCREV CUR MEDS BY ELIG CLIN: HCPCS | Performed by: OBSTETRICS & GYNECOLOGY

## 2018-02-15 PROCEDURE — 99242 OFF/OP CONSLTJ NEW/EST SF 20: CPT | Performed by: OBSTETRICS & GYNECOLOGY

## 2018-02-15 NOTE — PATIENT INSTRUCTIONS
if you are not able to? Many people choose a family member or close friend. ¨ Do you know enough about life support methods that might be used? If not, talk to your doctor so you understand. ¨ What are you most afraid of that might happen? You might be afraid of having pain, losing your independence, or being kept alive by machines. ¨ Where would you prefer to die? Choices include your home, a hospital, or a nursing home. ¨ Would you like to have information about hospice care to support you and your family? ¨ Do you want to donate organs when you die? ¨ Do you want certain Adventist practices performed before you die? If so, put your wishes in the advance directive. · Read your advance directive every year, and make changes as needed. When should you call for help? Be sure to contact your doctor if you have any questions. Where can you learn more? Go to [email or web address] and sign in to your [employer] account. Enter R264 in the [employer] box to learn more about \"Advance Directives: Care Instructions. \"     If you do not have an account, please click on the \"Sign Up Now\" link. Current as of: September 24, 2016  Content Version: 11.5  © 4064-4073 Healthwise, Incorporated. Care instructions adapted under license by [facility] ([facility]). If you have questions about a medical condition or this instruction, always ask your healthcare professional. [place] [address] any warranty or liability for your use of this information.

## 2018-02-20 ENCOUNTER — TELEPHONE (OUTPATIENT)
Dept: OBGYN | Age: 23
End: 2018-02-20

## 2018-02-22 ENCOUNTER — TELEPHONE (OUTPATIENT)
Dept: OBGYN | Age: 23
End: 2018-02-22

## 2018-02-22 ENCOUNTER — TELEPHONE (OUTPATIENT)
Dept: INTERNAL MEDICINE CLINIC | Age: 23
End: 2018-02-22

## 2018-02-22 NOTE — TELEPHONE ENCOUNTER
I have reached out several times with this patient I even mailed a letter out on 2/14/18 and has still not heard back from patient. I also called pt on 2/7 , 2/12/, 2/14, 2/19 and 2/22 today .

## 2018-02-22 NOTE — TELEPHONE ENCOUNTER
Attempted to reschedule pt's missed prenatal appointment and schedule NST's. Pt's Aunt answered the phone and stated Linwood Hunter is her Niece and she does not know how to find her and does not have a contact number for the patient. Writer attempted to verify patient correct mailing address but Aunt could not provide information and call was ended.   Writer mailed a certified letter to patient yesterday requesting pt to call and schedule NST's

## 2018-02-23 ENCOUNTER — HOSPITAL ENCOUNTER (OUTPATIENT)
Age: 23
Discharge: HOME | End: 2018-02-23
Payer: MEDICARE

## 2018-02-23 LAB
SEND OUT REPORT: NORMAL
TEST NAME: NORMAL
TOXOPLASM IGM: 0.44 INDEX
TOXOPLASMA BLOOD FOR RATIO: <0.5 IU/ML

## 2018-02-23 PROCEDURE — 86778 TOXOPLASMA ANTIBODY IGM: CPT

## 2018-02-23 PROCEDURE — 86644 CMV ANTIBODY: CPT

## 2018-02-23 PROCEDURE — 86747 PARVOVIRUS ANTIBODY: CPT

## 2018-02-23 PROCEDURE — 86777 TOXOPLASMA ANTIBODY: CPT

## 2018-02-23 PROCEDURE — 36415 COLL VENOUS BLD VENIPUNCTURE: CPT

## 2018-02-23 PROCEDURE — 86645 CMV ANTIBODY IGM: CPT

## 2018-02-25 LAB
PARVOVIRUS B19 IGG ANTIBODY: 4.97 IV
PARVOVIRUS B19 IGM ANTIBODY: 0.26 IV

## 2018-02-27 LAB
CMV IGM: 0.4
CYTOMEGALOVIRUS IGG ANTIBODY: 14.8

## 2018-03-06 ENCOUNTER — TELEPHONE (OUTPATIENT)
Dept: OBGYN | Age: 23
End: 2018-03-06

## 2018-03-08 ENCOUNTER — HOSPITAL ENCOUNTER (OUTPATIENT)
Age: 23
Discharge: HOME | End: 2018-03-08
Attending: OBSTETRICS & GYNECOLOGY | Admitting: OBSTETRICS & GYNECOLOGY
Payer: MEDICARE

## 2018-03-08 ENCOUNTER — ROUTINE PRENATAL (OUTPATIENT)
Dept: PERINATAL CARE | Age: 23
End: 2018-03-08
Payer: MEDICARE

## 2018-03-08 VITALS
SYSTOLIC BLOOD PRESSURE: 111 MMHG | RESPIRATION RATE: 20 BRPM | DIASTOLIC BLOOD PRESSURE: 68 MMHG | TEMPERATURE: 99.1 F | HEART RATE: 89 BPM

## 2018-03-08 VITALS
DIASTOLIC BLOOD PRESSURE: 70 MMHG | SYSTOLIC BLOOD PRESSURE: 113 MMHG | WEIGHT: 161 LBS | TEMPERATURE: 98.6 F | HEART RATE: 86 BPM | RESPIRATION RATE: 18 BRPM | BODY MASS INDEX: 25.27 KG/M2 | HEIGHT: 67 IN

## 2018-03-08 DIAGNOSIS — O99.313 ALCOHOL USE AFFECTING PREGNANCY IN THIRD TRIMESTER: ICD-10-CM

## 2018-03-08 DIAGNOSIS — O35.BXX0 ANOMALY OF HEART OF FETUS AFFECTING PREGNANCY, ANTEPARTUM, SINGLE OR UNSPECIFIED FETUS: Primary | ICD-10-CM

## 2018-03-08 DIAGNOSIS — O09.33 INSUFFICIENT PRENATAL CARE IN THIRD TRIMESTER: ICD-10-CM

## 2018-03-08 DIAGNOSIS — Z3A.33 33 WEEKS GESTATION OF PREGNANCY: ICD-10-CM

## 2018-03-08 DIAGNOSIS — O36.5930 POOR FETAL GROWTH AFFECTING MANAGEMENT OF MOTHER IN THIRD TRIMESTER, SINGLE OR UNSPECIFIED FETUS: ICD-10-CM

## 2018-03-08 DIAGNOSIS — Z36.4 ANTENATAL SCREENING FOR FETAL GROWTH RETARDATION USING ULTRASONICS: ICD-10-CM

## 2018-03-08 DIAGNOSIS — O35.2XX0 HEREDITARY FAMILIAL DISEASE AFFECTING MANAGEMENT OF MOTHER AND POSSIBLY AFFECTING FETUS, ANTEPARTUM, SINGLE OR UNSPECIFIED FETUS: ICD-10-CM

## 2018-03-08 PROBLEM — O09.90 HIGH RISK PREGNANCY, ANTEPARTUM: Status: ACTIVE | Noted: 2018-03-08

## 2018-03-08 LAB
-: ABNORMAL
AMORPHOUS: ABNORMAL
BACTERIA: ABNORMAL
BILIRUBIN URINE: NEGATIVE
CASTS UA: ABNORMAL /LPF (ref 0–8)
COLOR: YELLOW
COMMENT UA: ABNORMAL
CRYSTALS, UA: ABNORMAL /HPF
DIRECT EXAM: NORMAL
EPITHELIAL CELLS UA: ABNORMAL /HPF (ref 0–5)
GLUCOSE URINE: NEGATIVE
KETONES, URINE: NEGATIVE
LEUKOCYTE ESTERASE, URINE: ABNORMAL
Lab: NORMAL
MUCUS: ABNORMAL
NITRITE, URINE: NEGATIVE
OTHER OBSERVATIONS UA: ABNORMAL
PH UA: 7.5 (ref 5–8)
PROTEIN UA: NEGATIVE
RBC UA: ABNORMAL /HPF (ref 0–4)
RENAL EPITHELIAL, UA: ABNORMAL /HPF
SPECIFIC GRAVITY UA: 1.02 (ref 1–1.03)
SPECIMEN DESCRIPTION: NORMAL
STATUS: NORMAL
TRICHOMONAS: ABNORMAL
TURBIDITY: ABNORMAL
URINE HGB: NEGATIVE
UROBILINOGEN, URINE: NORMAL
WBC UA: ABNORMAL /HPF (ref 0–5)
YEAST: ABNORMAL

## 2018-03-08 PROCEDURE — 87510 GARDNER VAG DNA DIR PROBE: CPT

## 2018-03-08 PROCEDURE — 81001 URINALYSIS AUTO W/SCOPE: CPT

## 2018-03-08 PROCEDURE — 87480 CANDIDA DNA DIR PROBE: CPT

## 2018-03-08 PROCEDURE — 76821 MIDDLE CEREBRAL ARTERY ECHO: CPT | Performed by: OBSTETRICS & GYNECOLOGY

## 2018-03-08 PROCEDURE — 99213 OFFICE O/P EST LOW 20 MIN: CPT

## 2018-03-08 PROCEDURE — 76818 FETAL BIOPHYS PROFILE W/NST: CPT | Performed by: OBSTETRICS & GYNECOLOGY

## 2018-03-08 PROCEDURE — 99235 HOSP IP/OBS SAME DATE MOD 70: CPT | Performed by: OBSTETRICS & GYNECOLOGY

## 2018-03-08 PROCEDURE — 87591 N.GONORRHOEAE DNA AMP PROB: CPT

## 2018-03-08 PROCEDURE — 76816 OB US FOLLOW-UP PER FETUS: CPT | Performed by: OBSTETRICS & GYNECOLOGY

## 2018-03-08 PROCEDURE — 87491 CHLMYD TRACH DNA AMP PROBE: CPT

## 2018-03-08 PROCEDURE — 87086 URINE CULTURE/COLONY COUNT: CPT

## 2018-03-08 PROCEDURE — 87660 TRICHOMONAS VAGIN DIR PROBE: CPT

## 2018-03-08 PROCEDURE — 76820 UMBILICAL ARTERY ECHO: CPT | Performed by: OBSTETRICS & GYNECOLOGY

## 2018-03-08 RX ORDER — CEPHALEXIN 125 MG/5ML
125 POWDER, FOR SUSPENSION ORAL 4 TIMES DAILY
Qty: 200 ML | Refills: 0 | Status: SHIPPED | OUTPATIENT
Start: 2018-03-08 | End: 2018-03-18

## 2018-03-08 RX ORDER — ONDANSETRON 2 MG/ML
4 INJECTION INTRAMUSCULAR; INTRAVENOUS EVERY 6 HOURS PRN
Status: DISCONTINUED | OUTPATIENT
Start: 2018-03-08 | End: 2018-03-08 | Stop reason: HOSPADM

## 2018-03-08 RX ORDER — CEPHALEXIN 500 MG/1
500 CAPSULE ORAL 4 TIMES DAILY
Qty: 40 CAPSULE | Refills: 0 | Status: SHIPPED | OUTPATIENT
Start: 2018-03-08 | End: 2018-03-08 | Stop reason: HOSPADM

## 2018-03-08 RX ORDER — ACETAMINOPHEN 500 MG
1000 TABLET ORAL EVERY 6 HOURS PRN
Status: DISCONTINUED | OUTPATIENT
Start: 2018-03-08 | End: 2018-03-08 | Stop reason: HOSPADM

## 2018-03-09 LAB
C TRACH DNA GENITAL QL NAA+PROBE: NEGATIVE
CULTURE: NORMAL
CULTURE: NORMAL
Lab: NORMAL
N. GONORRHOEAE DNA: NEGATIVE
SPECIMEN DESCRIPTION: NORMAL
STATUS: NORMAL

## 2018-03-13 ENCOUNTER — ROUTINE PRENATAL (OUTPATIENT)
Dept: PERINATAL CARE | Age: 23
End: 2018-03-13
Payer: MEDICARE

## 2018-03-13 VITALS
SYSTOLIC BLOOD PRESSURE: 108 MMHG | BODY MASS INDEX: 25.84 KG/M2 | TEMPERATURE: 98 F | DIASTOLIC BLOOD PRESSURE: 62 MMHG | RESPIRATION RATE: 18 BRPM | HEART RATE: 90 BPM | WEIGHT: 165 LBS

## 2018-03-13 DIAGNOSIS — Z3A.33 33 WEEKS GESTATION OF PREGNANCY: ICD-10-CM

## 2018-03-13 DIAGNOSIS — O99.313 ALCOHOL USE AFFECTING PREGNANCY IN THIRD TRIMESTER: ICD-10-CM

## 2018-03-13 DIAGNOSIS — O35.BXX0 ANOMALY OF HEART OF FETUS AFFECTING PREGNANCY, ANTEPARTUM, SINGLE OR UNSPECIFIED FETUS: ICD-10-CM

## 2018-03-13 DIAGNOSIS — O09.33 INSUFFICIENT PRENATAL CARE IN THIRD TRIMESTER: ICD-10-CM

## 2018-03-13 DIAGNOSIS — O36.5930 POOR FETAL GROWTH AFFECTING MANAGEMENT OF MOTHER IN THIRD TRIMESTER, SINGLE OR UNSPECIFIED FETUS: Primary | ICD-10-CM

## 2018-03-13 PROCEDURE — 76820 UMBILICAL ARTERY ECHO: CPT | Performed by: OBSTETRICS & GYNECOLOGY

## 2018-03-13 PROCEDURE — 76821 MIDDLE CEREBRAL ARTERY ECHO: CPT | Performed by: OBSTETRICS & GYNECOLOGY

## 2018-03-13 PROCEDURE — 76818 FETAL BIOPHYS PROFILE W/NST: CPT | Performed by: OBSTETRICS & GYNECOLOGY

## 2018-03-13 PROCEDURE — 76815 OB US LIMITED FETUS(S): CPT | Performed by: OBSTETRICS & GYNECOLOGY

## 2018-03-19 ENCOUNTER — TELEPHONE (OUTPATIENT)
Dept: OBGYN | Age: 23
End: 2018-03-19

## 2018-03-19 NOTE — TELEPHONE ENCOUNTER
Sw met with pt to complete her initial assessment for [facility]. Pt reports that she needs assistance with housing, transportation, childcare, and baby items. Pt reports that she smokes tobacco products but has cut down. In addition, pt reports daily usage of marijuana. Sw educated pt on harmful effects of smoking during pregnancy. Pt reports that she sees Dr. Bryn Young for mental health services. Pt is currently working with lor hilliard with pathways.

## 2018-03-26 ENCOUNTER — TELEPHONE (OUTPATIENT)
Dept: OBGYN | Age: 23
End: 2018-03-26

## 2018-04-03 ENCOUNTER — ROUTINE PRENATAL (OUTPATIENT)
Dept: PERINATAL CARE | Age: 23
End: 2018-04-03
Payer: MEDICARE

## 2018-04-03 VITALS
SYSTOLIC BLOOD PRESSURE: 106 MMHG | BODY MASS INDEX: 27.31 KG/M2 | HEIGHT: 67 IN | HEART RATE: 84 BPM | TEMPERATURE: 98.1 F | RESPIRATION RATE: 16 BRPM | DIASTOLIC BLOOD PRESSURE: 70 MMHG | WEIGHT: 174 LBS

## 2018-04-03 DIAGNOSIS — O09.33 INSUFFICIENT PRENATAL CARE IN THIRD TRIMESTER: ICD-10-CM

## 2018-04-03 DIAGNOSIS — Z3A.36 36 WEEKS GESTATION OF PREGNANCY: ICD-10-CM

## 2018-04-03 DIAGNOSIS — O35.BXX0 ANOMALY OF HEART OF FETUS AFFECTING PREGNANCY, ANTEPARTUM, SINGLE OR UNSPECIFIED FETUS: Primary | ICD-10-CM

## 2018-04-03 DIAGNOSIS — O99.313 ALCOHOL USE AFFECTING PREGNANCY IN THIRD TRIMESTER: ICD-10-CM

## 2018-04-03 DIAGNOSIS — O35.2XX0 HEREDITARY FAMILIAL DISEASE AFFECTING MANAGEMENT OF MOTHER AND POSSIBLY AFFECTING FETUS, ANTEPARTUM, SINGLE OR UNSPECIFIED FETUS: ICD-10-CM

## 2018-04-03 DIAGNOSIS — Z36.4 ANTENATAL SCREENING FOR FETAL GROWTH RETARDATION USING ULTRASONICS: ICD-10-CM

## 2018-04-03 PROCEDURE — 76816 OB US FOLLOW-UP PER FETUS: CPT | Performed by: OBSTETRICS & GYNECOLOGY

## 2018-04-03 PROCEDURE — 76818 FETAL BIOPHYS PROFILE W/NST: CPT | Performed by: OBSTETRICS & GYNECOLOGY

## 2018-04-05 ENCOUNTER — ROUTINE PRENATAL (OUTPATIENT)
Dept: OBGYN | Age: 23
End: 2018-04-05
Payer: MEDICARE

## 2018-04-05 VITALS
HEART RATE: 76 BPM | DIASTOLIC BLOOD PRESSURE: 72 MMHG | WEIGHT: 171 LBS | SYSTOLIC BLOOD PRESSURE: 116 MMHG | BODY MASS INDEX: 26.78 KG/M2

## 2018-04-05 DIAGNOSIS — O09.33 INSUFFICIENT PRENATAL CARE IN THIRD TRIMESTER: ICD-10-CM

## 2018-04-05 DIAGNOSIS — F12.10 MILD TETRAHYDROCANNABINOL (THC) ABUSE: ICD-10-CM

## 2018-04-05 DIAGNOSIS — Z3A.37 37 WEEKS GESTATION OF PREGNANCY: ICD-10-CM

## 2018-04-05 DIAGNOSIS — F17.200 SMOKER: ICD-10-CM

## 2018-04-05 DIAGNOSIS — O09.93 HIGH-RISK PREGNANCY IN THIRD TRIMESTER: Primary | ICD-10-CM

## 2018-04-05 DIAGNOSIS — Z82.79 FAMILY HISTORY OF CONGENITAL HEART DISEASE: ICD-10-CM

## 2018-04-05 DIAGNOSIS — O35.BXX0 ANOMALY OF HEART OF FETUS AFFECTING PREGNANCY, ANTEPARTUM, SINGLE OR UNSPECIFIED FETUS: ICD-10-CM

## 2018-04-05 PROCEDURE — 99212 OFFICE O/P EST SF 10 MIN: CPT

## 2018-04-05 PROCEDURE — 99213 OFFICE O/P EST LOW 20 MIN: CPT | Performed by: OBSTETRICS & GYNECOLOGY

## 2018-04-10 ENCOUNTER — HOSPITAL ENCOUNTER (OUTPATIENT)
Age: 23
Discharge: HOME | End: 2018-04-10
Attending: OBSTETRICS & GYNECOLOGY | Admitting: OBSTETRICS & GYNECOLOGY
Payer: MEDICARE

## 2018-04-10 ENCOUNTER — INITIAL CONSULT (OUTPATIENT)
Dept: PEDIATRIC CARDIOLOGY | Age: 23
End: 2018-04-10
Payer: MEDICARE

## 2018-04-10 ENCOUNTER — ROUTINE PRENATAL (OUTPATIENT)
Dept: PERINATAL CARE | Age: 23
End: 2018-04-10
Payer: MEDICARE

## 2018-04-10 ENCOUNTER — HOSPITAL ENCOUNTER (OUTPATIENT)
Dept: NON INVASIVE DIAGNOSTICS | Age: 23
Discharge: HOME | End: 2018-04-10
Payer: MEDICARE

## 2018-04-10 VITALS
TEMPERATURE: 98.1 F | SYSTOLIC BLOOD PRESSURE: 110 MMHG | WEIGHT: 174 LBS | RESPIRATION RATE: 16 BRPM | BODY MASS INDEX: 27.25 KG/M2 | HEART RATE: 92 BPM | DIASTOLIC BLOOD PRESSURE: 68 MMHG

## 2018-04-10 VITALS
TEMPERATURE: 98 F | RESPIRATION RATE: 16 BRPM | DIASTOLIC BLOOD PRESSURE: 70 MMHG | SYSTOLIC BLOOD PRESSURE: 113 MMHG | HEART RATE: 101 BPM

## 2018-04-10 DIAGNOSIS — O09.33 INSUFFICIENT PRENATAL CARE IN THIRD TRIMESTER: ICD-10-CM

## 2018-04-10 DIAGNOSIS — Z3A.37 37 WEEKS GESTATION OF PREGNANCY: ICD-10-CM

## 2018-04-10 DIAGNOSIS — Z3A.37 37 WEEKS GESTATION OF PREGNANCY: Primary | ICD-10-CM

## 2018-04-10 DIAGNOSIS — O35.BXX0 ANOMALY OF HEART OF FETUS AFFECTING PREGNANCY, ANTEPARTUM, SINGLE OR UNSPECIFIED FETUS: Primary | ICD-10-CM

## 2018-04-10 PROCEDURE — 76818 FETAL BIOPHYS PROFILE W/NST: CPT | Performed by: OBSTETRICS & GYNECOLOGY

## 2018-04-10 PROCEDURE — 76815 OB US LIMITED FETUS(S): CPT | Performed by: OBSTETRICS & GYNECOLOGY

## 2018-04-10 PROCEDURE — G8419 CALC BMI OUT NRM PARAM NOF/U: HCPCS | Performed by: PEDIATRICS

## 2018-04-10 PROCEDURE — 99235 HOSP IP/OBS SAME DATE MOD 70: CPT | Performed by: OBSTETRICS & GYNECOLOGY

## 2018-04-10 PROCEDURE — 99213 OFFICE O/P EST LOW 20 MIN: CPT

## 2018-04-10 PROCEDURE — 99245 OFF/OP CONSLTJ NEW/EST HI 55: CPT | Performed by: PEDIATRICS

## 2018-04-10 PROCEDURE — G8428 CUR MEDS NOT DOCUMENT: HCPCS | Performed by: PEDIATRICS

## 2018-04-12 ENCOUNTER — ROUTINE PRENATAL (OUTPATIENT)
Dept: OBGYN | Age: 23
End: 2018-04-12
Payer: MEDICARE

## 2018-04-12 VITALS
BODY MASS INDEX: 27.66 KG/M2 | SYSTOLIC BLOOD PRESSURE: 115 MMHG | HEART RATE: 92 BPM | WEIGHT: 176.6 LBS | DIASTOLIC BLOOD PRESSURE: 70 MMHG

## 2018-04-12 DIAGNOSIS — Z3A.38 38 WEEKS GESTATION OF PREGNANCY: ICD-10-CM

## 2018-04-12 DIAGNOSIS — Z87.440 HISTORY OF GBS (GROUP B STREPTOCOCCUS) UTI, CURRENTLY PREGNANT: ICD-10-CM

## 2018-04-12 DIAGNOSIS — O09.33 INSUFFICIENT PRENATAL CARE IN THIRD TRIMESTER: ICD-10-CM

## 2018-04-12 DIAGNOSIS — F17.200 SMOKER: ICD-10-CM

## 2018-04-12 DIAGNOSIS — O09.899 HISTORY OF GBS (GROUP B STREPTOCOCCUS) UTI, CURRENTLY PREGNANT: ICD-10-CM

## 2018-04-12 DIAGNOSIS — F12.10 MILD TETRAHYDROCANNABINOL (THC) ABUSE: ICD-10-CM

## 2018-04-12 DIAGNOSIS — O09.93 HIGH-RISK PREGNANCY IN THIRD TRIMESTER: Primary | ICD-10-CM

## 2018-04-12 PROCEDURE — 99212 OFFICE O/P EST SF 10 MIN: CPT | Performed by: OBSTETRICS & GYNECOLOGY

## 2018-04-12 PROCEDURE — 99213 OFFICE O/P EST LOW 20 MIN: CPT | Performed by: OBSTETRICS & GYNECOLOGY

## 2018-04-15 ENCOUNTER — HOSPITAL ENCOUNTER (OUTPATIENT)
Age: 23
Discharge: HOME | End: 2018-04-16
Attending: OBSTETRICS & GYNECOLOGY | Admitting: OBSTETRICS & GYNECOLOGY
Payer: MEDICARE

## 2018-04-16 VITALS
SYSTOLIC BLOOD PRESSURE: 122 MMHG | TEMPERATURE: 97.8 F | DIASTOLIC BLOOD PRESSURE: 75 MMHG | HEART RATE: 98 BPM | RESPIRATION RATE: 20 BRPM

## 2018-04-16 PROCEDURE — 99213 OFFICE O/P EST LOW 20 MIN: CPT

## 2018-04-16 PROCEDURE — 99234 HOSP IP/OBS SM DT SF/LOW 45: CPT | Performed by: OBSTETRICS & GYNECOLOGY

## 2018-04-16 RX ORDER — ACETAMINOPHEN 325 MG/1
650 TABLET ORAL EVERY 4 HOURS PRN
Status: DISCONTINUED | OUTPATIENT
Start: 2018-04-16 | End: 2018-04-16 | Stop reason: HOSPADM

## 2018-04-18 ENCOUNTER — ROUTINE PRENATAL (OUTPATIENT)
Dept: PERINATAL CARE | Age: 23
End: 2018-04-18
Payer: MEDICARE

## 2018-04-18 VITALS
DIASTOLIC BLOOD PRESSURE: 78 MMHG | SYSTOLIC BLOOD PRESSURE: 123 MMHG | HEART RATE: 77 BPM | WEIGHT: 178 LBS | RESPIRATION RATE: 16 BRPM | TEMPERATURE: 98.1 F | BODY MASS INDEX: 27.88 KG/M2

## 2018-04-18 DIAGNOSIS — O99.313 ALCOHOL USE AFFECTING PREGNANCY IN THIRD TRIMESTER: ICD-10-CM

## 2018-04-18 DIAGNOSIS — O35.BXX0 ANOMALY OF HEART OF FETUS AFFECTING PREGNANCY, ANTEPARTUM, SINGLE OR UNSPECIFIED FETUS: ICD-10-CM

## 2018-04-18 DIAGNOSIS — O09.33 INSUFFICIENT PRENATAL CARE IN THIRD TRIMESTER: Primary | ICD-10-CM

## 2018-04-18 DIAGNOSIS — Z3A.38 38 WEEKS GESTATION OF PREGNANCY: ICD-10-CM

## 2018-04-18 PROCEDURE — 76818 FETAL BIOPHYS PROFILE W/NST: CPT | Performed by: OBSTETRICS & GYNECOLOGY

## 2018-04-19 ENCOUNTER — ROUTINE PRENATAL (OUTPATIENT)
Dept: OBGYN | Age: 23
End: 2018-04-19
Payer: MEDICARE

## 2018-04-19 VITALS
BODY MASS INDEX: 27.55 KG/M2 | SYSTOLIC BLOOD PRESSURE: 119 MMHG | DIASTOLIC BLOOD PRESSURE: 69 MMHG | WEIGHT: 175.9 LBS | HEART RATE: 79 BPM

## 2018-04-19 DIAGNOSIS — O09.32 LATE PRENATAL CARE AFFECTING PREGNANCY IN SECOND TRIMESTER: ICD-10-CM

## 2018-04-19 DIAGNOSIS — O09.93 HIGH-RISK PREGNANCY IN THIRD TRIMESTER: Primary | ICD-10-CM

## 2018-04-19 DIAGNOSIS — Z87.440 HISTORY OF GBS (GROUP B STREPTOCOCCUS) UTI, CURRENTLY PREGNANT: ICD-10-CM

## 2018-04-19 DIAGNOSIS — O09.899 HISTORY OF GBS (GROUP B STREPTOCOCCUS) UTI, CURRENTLY PREGNANT: ICD-10-CM

## 2018-04-19 DIAGNOSIS — F17.200 SMOKER: ICD-10-CM

## 2018-04-19 DIAGNOSIS — O35.BXX0 ANOMALY OF HEART OF FETUS AFFECTING PREGNANCY, ANTEPARTUM, SINGLE OR UNSPECIFIED FETUS: ICD-10-CM

## 2018-04-19 DIAGNOSIS — Z3A.39 39 WEEKS GESTATION OF PREGNANCY: ICD-10-CM

## 2018-04-19 DIAGNOSIS — O36.5930 POOR FETAL GROWTH AFFECTING MANAGEMENT OF MOTHER IN THIRD TRIMESTER, SINGLE OR UNSPECIFIED FETUS: ICD-10-CM

## 2018-04-19 DIAGNOSIS — F12.10 MILD TETRAHYDROCANNABINOL (THC) ABUSE: ICD-10-CM

## 2018-04-19 PROCEDURE — 99212 OFFICE O/P EST SF 10 MIN: CPT | Performed by: OBSTETRICS & GYNECOLOGY

## 2018-04-19 PROCEDURE — 99213 OFFICE O/P EST LOW 20 MIN: CPT | Performed by: OBSTETRICS & GYNECOLOGY

## 2018-04-26 ENCOUNTER — ROUTINE PRENATAL (OUTPATIENT)
Dept: OBGYN | Age: 23
End: 2018-04-26
Payer: MEDICARE

## 2018-04-26 ENCOUNTER — HOSPITAL ENCOUNTER (OUTPATIENT)
Age: 23
Discharge: HOME | DRG: 560 | End: 2018-04-26
Attending: OBSTETRICS & GYNECOLOGY | Admitting: OBSTETRICS & GYNECOLOGY
Payer: MEDICARE

## 2018-04-26 VITALS
DIASTOLIC BLOOD PRESSURE: 65 MMHG | RESPIRATION RATE: 16 BRPM | HEART RATE: 97 BPM | TEMPERATURE: 98.3 F | SYSTOLIC BLOOD PRESSURE: 109 MMHG

## 2018-04-26 VITALS
SYSTOLIC BLOOD PRESSURE: 117 MMHG | DIASTOLIC BLOOD PRESSURE: 73 MMHG | BODY MASS INDEX: 27.63 KG/M2 | HEART RATE: 96 BPM | WEIGHT: 176.4 LBS

## 2018-04-26 DIAGNOSIS — O09.899 HISTORY OF GBS (GROUP B STREPTOCOCCUS) UTI, CURRENTLY PREGNANT: ICD-10-CM

## 2018-04-26 DIAGNOSIS — O09.93 HIGH-RISK PREGNANCY IN THIRD TRIMESTER: Primary | ICD-10-CM

## 2018-04-26 DIAGNOSIS — O35.BXX0 ANOMALY OF HEART OF FETUS AFFECTING PREGNANCY, ANTEPARTUM, SINGLE OR UNSPECIFIED FETUS: ICD-10-CM

## 2018-04-26 DIAGNOSIS — F17.200 SMOKER: ICD-10-CM

## 2018-04-26 DIAGNOSIS — Z3A.40 40 WEEKS GESTATION OF PREGNANCY: ICD-10-CM

## 2018-04-26 DIAGNOSIS — O09.33 INSUFFICIENT PRENATAL CARE IN THIRD TRIMESTER: ICD-10-CM

## 2018-04-26 DIAGNOSIS — F12.10 MILD TETRAHYDROCANNABINOL (THC) ABUSE: ICD-10-CM

## 2018-04-26 DIAGNOSIS — Z87.440 HISTORY OF GBS (GROUP B STREPTOCOCCUS) UTI, CURRENTLY PREGNANT: ICD-10-CM

## 2018-04-26 PROCEDURE — 76818 FETAL BIOPHYS PROFILE W/NST: CPT

## 2018-04-26 PROCEDURE — 99213 OFFICE O/P EST LOW 20 MIN: CPT | Performed by: OBSTETRICS & GYNECOLOGY

## 2018-04-26 PROCEDURE — 99212 OFFICE O/P EST SF 10 MIN: CPT | Performed by: OBSTETRICS & GYNECOLOGY

## 2018-04-27 ENCOUNTER — APPOINTMENT (OUTPATIENT)
Dept: LABOR AND DELIVERY | Age: 23
DRG: 560 | End: 2018-04-27
Payer: MEDICARE

## 2018-04-27 ENCOUNTER — HOSPITAL ENCOUNTER (INPATIENT)
Age: 23
LOS: 3 days | Discharge: HOME | DRG: 560 | End: 2018-04-30
Attending: OBSTETRICS & GYNECOLOGY | Admitting: OBSTETRICS & GYNECOLOGY
Payer: MEDICARE

## 2018-04-27 LAB
-: ABNORMAL
ABO/RH: NORMAL
ABSOLUTE EOS #: 0.03 K/UL (ref 0–0.44)
ABSOLUTE IMMATURE GRANULOCYTE: 0.12 K/UL (ref 0–0.3)
ABSOLUTE LYMPH #: 2.45 K/UL (ref 1.1–3.7)
ABSOLUTE MONO #: 0.99 K/UL (ref 0.1–1.4)
AMORPHOUS: ABNORMAL
AMPHETAMINE SCREEN URINE: NEGATIVE
ANTIBODY SCREEN: NEGATIVE
ARM BAND NUMBER: NORMAL
BACTERIA: ABNORMAL
BARBITURATE SCREEN URINE: NEGATIVE
BASOPHILS # BLD: 0 % (ref 0–2)
BASOPHILS ABSOLUTE: 0.03 K/UL (ref 0–0.2)
BENZODIAZEPINE SCREEN, URINE: NEGATIVE
BILIRUBIN URINE: NEGATIVE
BUPRENORPHINE URINE: NORMAL
CANNABINOID SCREEN URINE: NEGATIVE
CASTS UA: ABNORMAL /LPF (ref 0–8)
COCAINE METABOLITE, URINE: NEGATIVE
COLOR: YELLOW
COMMENT UA: ABNORMAL
CRYSTALS, UA: ABNORMAL /HPF
DIFFERENTIAL TYPE: ABNORMAL
EOSINOPHILS RELATIVE PERCENT: 0 % (ref 1–4)
EPITHELIAL CELLS UA: ABNORMAL /HPF (ref 0–5)
EXPIRATION DATE: NORMAL
GLUCOSE URINE: NEGATIVE
HCT VFR BLD CALC: 30.3 % (ref 36.3–47.1)
HEMOGLOBIN: 9.6 G/DL (ref 11.9–15.1)
IMMATURE GRANULOCYTES: 1 %
KETONES, URINE: NEGATIVE
LEUKOCYTE ESTERASE, URINE: ABNORMAL
LYMPHOCYTES # BLD: 25 % (ref 25–45)
MCH RBC QN AUTO: 29.1 PG (ref 25.2–33.5)
MCHC RBC AUTO-ENTMCNC: 31.7 G/DL (ref 28.4–34.8)
MCV RBC AUTO: 91.8 FL (ref 82.6–102.9)
MDMA URINE: NORMAL
METHADONE SCREEN, URINE: NEGATIVE
METHAMPHETAMINE, URINE: NORMAL
MONOCYTES # BLD: 10 % (ref 2–8)
MUCUS: ABNORMAL
NITRITE, URINE: NEGATIVE
NRBC AUTOMATED: 0 PER 100 WBC
OPIATES, URINE: NEGATIVE
OTHER OBSERVATIONS UA: ABNORMAL
OXYCODONE SCREEN URINE: NEGATIVE
PDW BLD-RTO: 13.3 % (ref 11.8–14.4)
PH UA: 7.5 (ref 5–8)
PHENCYCLIDINE, URINE: NEGATIVE
PLATELET # BLD: 200 K/UL (ref 138–453)
PLATELET ESTIMATE: ABNORMAL
PMV BLD AUTO: 11 FL (ref 8.1–13.5)
PROPOXYPHENE, URINE: NORMAL
PROTEIN UA: NEGATIVE
RBC # BLD: 3.3 M/UL (ref 3.95–5.11)
RBC # BLD: ABNORMAL 10*6/UL
RBC UA: ABNORMAL /HPF (ref 0–4)
RENAL EPITHELIAL, UA: ABNORMAL /HPF
SEG NEUTROPHILS: 64 % (ref 34–64)
SEGMENTED NEUTROPHILS ABSOLUTE COUNT: 6.07 K/UL (ref 1.5–8.1)
SPECIFIC GRAVITY UA: 1.02 (ref 1–1.03)
T. PALLIDUM, IGG: NONREACTIVE
TEST INFORMATION: NORMAL
TRICHOMONAS: ABNORMAL
TRICYCLIC ANTIDEPRESSANTS, UR: NORMAL
TURBIDITY: CLEAR
URINE HGB: ABNORMAL
UROBILINOGEN, URINE: NORMAL
WBC # BLD: 9.7 K/UL (ref 4.5–13.5)
WBC # BLD: ABNORMAL 10*3/UL
WBC UA: ABNORMAL /HPF (ref 0–5)
YEAST: ABNORMAL

## 2018-04-27 PROCEDURE — 80307 DRUG TEST PRSMV CHEM ANLYZR: CPT

## 2018-04-27 PROCEDURE — 6370000000 HC RX 637 (ALT 250 FOR IP): Performed by: OBSTETRICS & GYNECOLOGY

## 2018-04-27 PROCEDURE — 86900 BLOOD TYPING SEROLOGIC ABO: CPT

## 2018-04-27 PROCEDURE — 2580000003 HC RX 258: Performed by: STUDENT IN AN ORGANIZED HEALTH CARE EDUCATION/TRAINING PROGRAM

## 2018-04-27 PROCEDURE — 86850 RBC ANTIBODY SCREEN: CPT

## 2018-04-27 PROCEDURE — 6360000002 HC RX W HCPCS: Performed by: STUDENT IN AN ORGANIZED HEALTH CARE EDUCATION/TRAINING PROGRAM

## 2018-04-27 PROCEDURE — 81001 URINALYSIS AUTO W/SCOPE: CPT

## 2018-04-27 PROCEDURE — 87086 URINE CULTURE/COLONY COUNT: CPT

## 2018-04-27 PROCEDURE — 6370000000 HC RX 637 (ALT 250 FOR IP): Performed by: STUDENT IN AN ORGANIZED HEALTH CARE EDUCATION/TRAINING PROGRAM

## 2018-04-27 PROCEDURE — 86901 BLOOD TYPING SEROLOGIC RH(D): CPT

## 2018-04-27 PROCEDURE — 85025 COMPLETE CBC W/AUTO DIFF WBC: CPT

## 2018-04-27 PROCEDURE — 1220000000 HC SEMI PRIVATE OB R&B

## 2018-04-27 PROCEDURE — 3E0P7VZ INTRODUCTION OF HORMONE INTO FEMALE REPRODUCTIVE, VIA NATURAL OR ARTIFICIAL OPENING: ICD-10-PCS | Performed by: OBSTETRICS & GYNECOLOGY

## 2018-04-27 PROCEDURE — 86780 TREPONEMA PALLIDUM: CPT

## 2018-04-27 RX ORDER — NALBUPHINE HCL 10 MG/ML
10 AMPUL (ML) INJECTION
Status: DISCONTINUED | OUTPATIENT
Start: 2018-04-27 | End: 2018-04-28

## 2018-04-27 RX ORDER — ONDANSETRON 2 MG/ML
4 INJECTION INTRAMUSCULAR; INTRAVENOUS EVERY 6 HOURS PRN
Status: DISCONTINUED | OUTPATIENT
Start: 2018-04-27 | End: 2018-04-28

## 2018-04-27 RX ORDER — SODIUM CHLORIDE, SODIUM LACTATE, POTASSIUM CHLORIDE, CALCIUM CHLORIDE 600; 310; 30; 20 MG/100ML; MG/100ML; MG/100ML; MG/100ML
INJECTION, SOLUTION INTRAVENOUS CONTINUOUS
Status: DISCONTINUED | OUTPATIENT
Start: 2018-04-27 | End: 2018-04-28

## 2018-04-27 RX ORDER — ACETAMINOPHEN 325 MG/1
650 TABLET ORAL EVERY 4 HOURS PRN
Status: DISCONTINUED | OUTPATIENT
Start: 2018-04-27 | End: 2018-04-28

## 2018-04-27 RX ORDER — DIPHENHYDRAMINE HCL 25 MG
25 TABLET ORAL EVERY 4 HOURS PRN
Status: DISCONTINUED | OUTPATIENT
Start: 2018-04-27 | End: 2018-04-28

## 2018-04-27 RX ADMIN — SODIUM CHLORIDE, POTASSIUM CHLORIDE, SODIUM LACTATE AND CALCIUM CHLORIDE: 600; 310; 30; 20 INJECTION, SOLUTION INTRAVENOUS at 09:40

## 2018-04-27 RX ADMIN — NALBUPHINE HYDROCHLORIDE 10 MG: 10 INJECTION, SOLUTION INTRAMUSCULAR; INTRAVENOUS; SUBCUTANEOUS at 23:52

## 2018-04-27 RX ADMIN — Medication 2.5 MILLION UNITS: at 14:15

## 2018-04-27 RX ADMIN — Medication 2.5 MILLION UNITS: at 18:00

## 2018-04-27 RX ADMIN — SODIUM CHLORIDE, POTASSIUM CHLORIDE, SODIUM LACTATE AND CALCIUM CHLORIDE: 600; 310; 30; 20 INJECTION, SOLUTION INTRAVENOUS at 19:13

## 2018-04-27 RX ADMIN — Medication 1 MILLI-UNITS/MIN: at 22:57

## 2018-04-27 RX ADMIN — Medication 25 MCG: at 15:01

## 2018-04-27 RX ADMIN — Medication 50 MCG: at 10:52

## 2018-04-27 RX ADMIN — Medication 50 MCG: at 18:14

## 2018-04-27 RX ADMIN — Medication 2.5 MILLION UNITS: at 22:18

## 2018-04-27 RX ADMIN — DEXTROSE MONOHYDRATE 5 MILLION UNITS: 50 INJECTION, SOLUTION INTRAVENOUS at 10:08

## 2018-04-27 ASSESSMENT — PAIN SCALES - GENERAL: PAINLEVEL_OUTOF10: 10

## 2018-04-28 LAB
CULTURE: NO GROWTH
CULTURE: NORMAL
Lab: NORMAL
SPECIMEN DESCRIPTION: NORMAL
STATUS: NORMAL

## 2018-04-28 PROCEDURE — 59409 OBSTETRICAL CARE: CPT | Performed by: OBSTETRICS & GYNECOLOGY

## 2018-04-28 PROCEDURE — 6370000000 HC RX 637 (ALT 250 FOR IP): Performed by: STUDENT IN AN ORGANIZED HEALTH CARE EDUCATION/TRAINING PROGRAM

## 2018-04-28 PROCEDURE — 88307 TISSUE EXAM BY PATHOLOGIST: CPT

## 2018-04-28 PROCEDURE — 6360000002 HC RX W HCPCS: Performed by: STUDENT IN AN ORGANIZED HEALTH CARE EDUCATION/TRAINING PROGRAM

## 2018-04-28 PROCEDURE — 10907ZC DRAINAGE OF AMNIOTIC FLUID, THERAPEUTIC FROM PRODUCTS OF CONCEPTION, VIA NATURAL OR ARTIFICIAL OPENING: ICD-10-PCS | Performed by: OBSTETRICS & GYNECOLOGY

## 2018-04-28 PROCEDURE — 7200000001 HC VAGINAL DELIVERY

## 2018-04-28 PROCEDURE — 1220000000 HC SEMI PRIVATE OB R&B

## 2018-04-28 PROCEDURE — 96365 THER/PROPH/DIAG IV INF INIT: CPT

## 2018-04-28 PROCEDURE — 96366 THER/PROPH/DIAG IV INF ADDON: CPT

## 2018-04-28 RX ORDER — LANOLIN 100 %
OINTMENT (GRAM) TOPICAL PRN
Status: DISCONTINUED | OUTPATIENT
Start: 2018-04-28 | End: 2018-04-30 | Stop reason: HOSPADM

## 2018-04-28 RX ORDER — SODIUM CHLORIDE 0.9 % (FLUSH) 0.9 %
10 SYRINGE (ML) INJECTION EVERY 12 HOURS SCHEDULED
Status: DISCONTINUED | OUTPATIENT
Start: 2018-04-28 | End: 2018-04-30 | Stop reason: HOSPADM

## 2018-04-28 RX ORDER — IBUPROFEN 800 MG/1
800 TABLET ORAL EVERY 8 HOURS PRN
Status: DISCONTINUED | OUTPATIENT
Start: 2018-04-28 | End: 2018-04-30 | Stop reason: HOSPADM

## 2018-04-28 RX ORDER — IBUPROFEN 800 MG/1
800 TABLET ORAL EVERY 8 HOURS PRN
Qty: 30 TABLET | Refills: 0 | Status: SHIPPED | OUTPATIENT
Start: 2018-04-28 | End: 2019-07-10

## 2018-04-28 RX ORDER — DOCUSATE SODIUM 100 MG/1
100 CAPSULE, LIQUID FILLED ORAL 2 TIMES DAILY
Status: DISCONTINUED | OUTPATIENT
Start: 2018-04-28 | End: 2018-04-30 | Stop reason: HOSPADM

## 2018-04-28 RX ORDER — KETOROLAC TROMETHAMINE 30 MG/ML
30 INJECTION, SOLUTION INTRAMUSCULAR; INTRAVENOUS ONCE
Status: DISCONTINUED | OUTPATIENT
Start: 2018-04-28 | End: 2018-04-28

## 2018-04-28 RX ORDER — PSEUDOEPHEDRINE HCL 30 MG
100 TABLET ORAL 2 TIMES DAILY PRN
Qty: 60 CAPSULE | Refills: 0 | Status: ON HOLD | OUTPATIENT
Start: 2018-04-28 | End: 2019-12-04 | Stop reason: HOSPADM

## 2018-04-28 RX ORDER — ACETAMINOPHEN 325 MG/1
650 TABLET ORAL EVERY 4 HOURS PRN
Status: DISCONTINUED | OUTPATIENT
Start: 2018-04-28 | End: 2018-04-30 | Stop reason: HOSPADM

## 2018-04-28 RX ORDER — ONDANSETRON 4 MG/1
8 TABLET, FILM COATED ORAL EVERY 8 HOURS PRN
Status: DISCONTINUED | OUTPATIENT
Start: 2018-04-28 | End: 2018-04-30 | Stop reason: HOSPADM

## 2018-04-28 RX ORDER — SODIUM CHLORIDE 0.9 % (FLUSH) 0.9 %
10 SYRINGE (ML) INJECTION PRN
Status: DISCONTINUED | OUTPATIENT
Start: 2018-04-28 | End: 2018-04-30 | Stop reason: HOSPADM

## 2018-04-28 RX ADMIN — IBUPROFEN 800 MG: 800 TABLET ORAL at 23:09

## 2018-04-28 RX ADMIN — IBUPROFEN 800 MG: 800 TABLET ORAL at 11:54

## 2018-04-28 RX ADMIN — Medication 1 MILLI-UNITS/MIN: at 03:18

## 2018-04-28 RX ADMIN — IBUPROFEN 800 MG: 800 TABLET ORAL at 03:50

## 2018-04-28 RX ADMIN — Medication 2.5 MILLION UNITS: at 01:46

## 2018-04-28 RX ADMIN — DOCUSATE SODIUM 100 MG: 100 CAPSULE ORAL at 23:09

## 2018-04-28 ASSESSMENT — PAIN DESCRIPTION - DESCRIPTORS: DESCRIPTORS: CRAMPING

## 2018-04-28 ASSESSMENT — PAIN SCALES - GENERAL
PAINLEVEL_OUTOF10: 5

## 2018-04-29 PROCEDURE — 1220000000 HC SEMI PRIVATE OB R&B

## 2018-04-29 PROCEDURE — 6370000000 HC RX 637 (ALT 250 FOR IP): Performed by: STUDENT IN AN ORGANIZED HEALTH CARE EDUCATION/TRAINING PROGRAM

## 2018-04-29 RX ADMIN — IBUPROFEN 800 MG: 800 TABLET ORAL at 23:55

## 2018-04-29 RX ADMIN — IBUPROFEN 800 MG: 800 TABLET ORAL at 15:29

## 2018-04-29 RX ADMIN — IBUPROFEN 800 MG: 800 TABLET ORAL at 07:38

## 2018-04-29 ASSESSMENT — PAIN DESCRIPTION - DESCRIPTORS: DESCRIPTORS: CRAMPING

## 2018-04-29 ASSESSMENT — PAIN SCALES - GENERAL
PAINLEVEL_OUTOF10: 4
PAINLEVEL_OUTOF10: 5
PAINLEVEL_OUTOF10: 4

## 2018-04-29 ASSESSMENT — PAIN DESCRIPTION - LOCATION: LOCATION: ABDOMEN

## 2018-04-29 ASSESSMENT — PAIN DESCRIPTION - PAIN TYPE: TYPE: ACUTE PAIN

## 2018-04-30 VITALS
RESPIRATION RATE: 18 BRPM | WEIGHT: 176 LBS | SYSTOLIC BLOOD PRESSURE: 104 MMHG | TEMPERATURE: 98.1 F | HEIGHT: 67 IN | DIASTOLIC BLOOD PRESSURE: 68 MMHG | BODY MASS INDEX: 27.62 KG/M2 | HEART RATE: 69 BPM

## 2018-04-30 ASSESSMENT — PAIN DESCRIPTION - LOCATION: LOCATION: ABDOMEN

## 2018-04-30 ASSESSMENT — PAIN DESCRIPTION - DESCRIPTORS: DESCRIPTORS: CRAMPING

## 2018-04-30 ASSESSMENT — PAIN SCALES - GENERAL: PAINLEVEL_OUTOF10: 3

## 2018-04-30 ASSESSMENT — PAIN DESCRIPTION - PAIN TYPE: TYPE: ACUTE PAIN

## 2018-05-01 LAB — SURGICAL PATHOLOGY REPORT: NORMAL

## 2018-05-02 ENCOUNTER — TELEPHONE (OUTPATIENT)
Dept: OBGYN | Age: 23
End: 2018-05-02

## 2019-04-26 ENCOUNTER — HOSPITAL ENCOUNTER (EMERGENCY)
Age: 24
Discharge: HOME | End: 2019-04-27
Attending: EMERGENCY MEDICINE
Payer: MEDICARE

## 2019-04-26 VITALS
SYSTOLIC BLOOD PRESSURE: 117 MMHG | TEMPERATURE: 98.2 F | OXYGEN SATURATION: 98 % | HEART RATE: 88 BPM | DIASTOLIC BLOOD PRESSURE: 76 MMHG | RESPIRATION RATE: 16 BRPM

## 2019-04-26 DIAGNOSIS — A59.9 TRICHIMONIASIS: ICD-10-CM

## 2019-04-26 DIAGNOSIS — N30.00 ACUTE CYSTITIS WITHOUT HEMATURIA: ICD-10-CM

## 2019-04-26 DIAGNOSIS — O21.9 VOMITING OF PREGNANCY, ANTEPARTUM: Primary | ICD-10-CM

## 2019-04-26 DIAGNOSIS — B96.89 BV (BACTERIAL VAGINOSIS): ICD-10-CM

## 2019-04-26 DIAGNOSIS — N76.0 BV (BACTERIAL VAGINOSIS): ICD-10-CM

## 2019-04-26 LAB
ABSOLUTE EOS #: <0.03 K/UL (ref 0–0.44)
ABSOLUTE IMMATURE GRANULOCYTE: <0.03 K/UL (ref 0–0.3)
ABSOLUTE LYMPH #: 2.05 K/UL (ref 1.1–3.7)
ABSOLUTE MONO #: 0.7 K/UL (ref 0.1–1.2)
ALBUMIN SERPL-MCNC: 4.6 G/DL (ref 3.5–5.2)
ALBUMIN/GLOBULIN RATIO: 1.2 (ref 1–2.5)
ALP BLD-CCNC: 41 U/L (ref 35–104)
ALT SERPL-CCNC: 10 U/L (ref 5–33)
ANION GAP SERPL CALCULATED.3IONS-SCNC: 14 MMOL/L (ref 9–17)
AST SERPL-CCNC: 19 U/L
BASOPHILS # BLD: 0 % (ref 0–2)
BASOPHILS ABSOLUTE: <0.03 K/UL (ref 0–0.2)
BILIRUB SERPL-MCNC: 0.61 MG/DL (ref 0.3–1.2)
BILIRUBIN DIRECT: 0.12 MG/DL
BILIRUBIN, INDIRECT: 0.49 MG/DL (ref 0–1)
BUN BLDV-MCNC: 10 MG/DL (ref 6–20)
BUN/CREAT BLD: ABNORMAL (ref 9–20)
CALCIUM SERPL-MCNC: 9.6 MG/DL (ref 8.6–10.4)
CHLORIDE BLD-SCNC: 97 MMOL/L (ref 98–107)
CO2: 20 MMOL/L (ref 20–31)
CREAT SERPL-MCNC: 0.53 MG/DL (ref 0.5–0.9)
DIFFERENTIAL TYPE: ABNORMAL
DIRECT EXAM: ABNORMAL
EOSINOPHILS RELATIVE PERCENT: 0 % (ref 1–4)
GFR AFRICAN AMERICAN: >60 ML/MIN
GFR NON-AFRICAN AMERICAN: >60 ML/MIN
GFR SERPL CREATININE-BSD FRML MDRD: ABNORMAL ML/MIN/{1.73_M2}
GFR SERPL CREATININE-BSD FRML MDRD: ABNORMAL ML/MIN/{1.73_M2}
GLOBULIN: NORMAL G/DL (ref 1.5–3.8)
GLUCOSE BLD-MCNC: 92 MG/DL (ref 70–99)
HCT VFR BLD CALC: 37.2 % (ref 36.3–47.1)
HEMOGLOBIN: 12.2 G/DL (ref 11.9–15.1)
IMMATURE GRANULOCYTES: 0 %
LIPASE: 16 U/L (ref 13–60)
LYMPHOCYTES # BLD: 34 % (ref 24–43)
Lab: ABNORMAL
MCH RBC QN AUTO: 28.8 PG (ref 25.2–33.5)
MCHC RBC AUTO-ENTMCNC: 32.8 G/DL (ref 28.4–34.8)
MCV RBC AUTO: 87.9 FL (ref 82.6–102.9)
MONOCYTES # BLD: 12 % (ref 3–12)
NRBC AUTOMATED: 0 PER 100 WBC
PDW BLD-RTO: 15.3 % (ref 11.8–14.4)
PLATELET # BLD: 255 K/UL (ref 138–453)
PLATELET ESTIMATE: ABNORMAL
PMV BLD AUTO: 9.4 FL (ref 8.1–13.5)
POTASSIUM SERPL-SCNC: 3.4 MMOL/L (ref 3.7–5.3)
RBC # BLD: 4.23 M/UL (ref 3.95–5.11)
RBC # BLD: ABNORMAL 10*6/UL
SEG NEUTROPHILS: 54 % (ref 36–65)
SEGMENTED NEUTROPHILS ABSOLUTE COUNT: 3.28 K/UL (ref 1.5–8.1)
SODIUM BLD-SCNC: 131 MMOL/L (ref 135–144)
SPECIMEN DESCRIPTION: ABNORMAL
TOTAL PROTEIN: 8.3 G/DL (ref 6.4–8.3)
WBC # BLD: 6.1 K/UL (ref 3.5–11.3)
WBC # BLD: ABNORMAL 10*3/UL

## 2019-04-26 PROCEDURE — 87591 N.GONORRHOEAE DNA AMP PROB: CPT

## 2019-04-26 PROCEDURE — 80076 HEPATIC FUNCTION PANEL: CPT

## 2019-04-26 PROCEDURE — 87480 CANDIDA DNA DIR PROBE: CPT

## 2019-04-26 PROCEDURE — 85025 COMPLETE CBC W/AUTO DIFF WBC: CPT

## 2019-04-26 PROCEDURE — 87660 TRICHOMONAS VAGIN DIR PROBE: CPT

## 2019-04-26 PROCEDURE — 99284 EMERGENCY DEPT VISIT MOD MDM: CPT

## 2019-04-26 PROCEDURE — 87510 GARDNER VAG DNA DIR PROBE: CPT

## 2019-04-26 PROCEDURE — 80048 BASIC METABOLIC PNL TOTAL CA: CPT

## 2019-04-26 PROCEDURE — 83690 ASSAY OF LIPASE: CPT

## 2019-04-26 PROCEDURE — 96374 THER/PROPH/DIAG INJ IV PUSH: CPT

## 2019-04-26 PROCEDURE — 96361 HYDRATE IV INFUSION ADD-ON: CPT

## 2019-04-26 PROCEDURE — 84702 CHORIONIC GONADOTROPIN TEST: CPT

## 2019-04-26 PROCEDURE — 6360000002 HC RX W HCPCS: Performed by: STUDENT IN AN ORGANIZED HEALTH CARE EDUCATION/TRAINING PROGRAM

## 2019-04-26 PROCEDURE — 87491 CHLMYD TRACH DNA AMP PROBE: CPT

## 2019-04-26 PROCEDURE — 2580000003 HC RX 258: Performed by: STUDENT IN AN ORGANIZED HEALTH CARE EDUCATION/TRAINING PROGRAM

## 2019-04-26 RX ORDER — METRONIDAZOLE 500 MG/1
500 TABLET ORAL ONCE
Status: COMPLETED | OUTPATIENT
Start: 2019-04-27 | End: 2019-04-27

## 2019-04-26 RX ORDER — ONDANSETRON 2 MG/ML
4 INJECTION INTRAMUSCULAR; INTRAVENOUS ONCE
Status: COMPLETED | OUTPATIENT
Start: 2019-04-26 | End: 2019-04-26

## 2019-04-26 RX ORDER — CEFTRIAXONE SODIUM 250 MG/1
250 INJECTION, POWDER, FOR SOLUTION INTRAMUSCULAR; INTRAVENOUS ONCE
Status: COMPLETED | OUTPATIENT
Start: 2019-04-27 | End: 2019-04-27

## 2019-04-26 RX ORDER — AZITHROMYCIN 250 MG/1
1000 TABLET, FILM COATED ORAL ONCE
Status: COMPLETED | OUTPATIENT
Start: 2019-04-27 | End: 2019-04-27

## 2019-04-26 RX ORDER — 0.9 % SODIUM CHLORIDE 0.9 %
1000 INTRAVENOUS SOLUTION INTRAVENOUS ONCE
Status: COMPLETED | OUTPATIENT
Start: 2019-04-26 | End: 2019-04-26

## 2019-04-26 RX ADMIN — SODIUM CHLORIDE 1000 ML: 9 INJECTION, SOLUTION INTRAVENOUS at 22:52

## 2019-04-26 RX ADMIN — ONDANSETRON 4 MG: 2 INJECTION INTRAMUSCULAR; INTRAVENOUS at 23:44

## 2019-04-26 ASSESSMENT — ENCOUNTER SYMPTOMS
SHORTNESS OF BREATH: 0
COUGH: 0
DIARRHEA: 0
CONSTIPATION: 0
NAUSEA: 1
BACK PAIN: 0
ABDOMINAL DISTENTION: 0
WHEEZING: 0
ABDOMINAL PAIN: 1
VOMITING: 1
BLOOD IN STOOL: 0

## 2019-04-26 ASSESSMENT — PAIN DESCRIPTION - PAIN TYPE: TYPE: ACUTE PAIN

## 2019-04-26 ASSESSMENT — PAIN DESCRIPTION - LOCATION: LOCATION: ABDOMEN

## 2019-04-26 ASSESSMENT — PAIN DESCRIPTION - ORIENTATION: ORIENTATION: RIGHT;LEFT

## 2019-04-26 ASSESSMENT — PAIN SCALES - GENERAL: PAINLEVEL_OUTOF10: 9

## 2019-04-26 ASSESSMENT — PAIN DESCRIPTION - DESCRIPTORS: DESCRIPTORS: ACHING

## 2019-04-27 ENCOUNTER — APPOINTMENT (OUTPATIENT)
Dept: ULTRASOUND IMAGING | Age: 24
End: 2019-04-27
Payer: MEDICARE

## 2019-04-27 LAB
-: ABNORMAL
AMORPHOUS: ABNORMAL
BACTERIA: ABNORMAL
BILIRUBIN URINE: NEGATIVE
CASTS UA: ABNORMAL /LPF (ref 0–2)
COLOR: YELLOW
CRYSTALS, UA: ABNORMAL /HPF
EPITHELIAL CELLS UA: ABNORMAL /HPF (ref 0–5)
GLUCOSE URINE: NEGATIVE
HCG QUANTITATIVE: ABNORMAL IU/L
HCG(URINE) PREGNANCY TEST: POSITIVE
KETONES, URINE: ABNORMAL
LEUKOCYTE ESTERASE, URINE: ABNORMAL
MUCUS: ABNORMAL
NITRITE, URINE: NEGATIVE
OTHER OBSERVATIONS UA: ABNORMAL
PH UA: 6 (ref 5–8)
PROTEIN UA: ABNORMAL
RBC UA: ABNORMAL /HPF (ref 0–2)
RENAL EPITHELIAL, UA: ABNORMAL /HPF
SPECIFIC GRAVITY UA: 1.04 (ref 1–1.03)
TRICHOMONAS: ABNORMAL
TURBIDITY: ABNORMAL
URINE HGB: NEGATIVE
UROBILINOGEN, URINE: NORMAL
WBC UA: ABNORMAL /HPF (ref 0–5)
YEAST: ABNORMAL

## 2019-04-27 PROCEDURE — 87086 URINE CULTURE/COLONY COUNT: CPT

## 2019-04-27 PROCEDURE — 96372 THER/PROPH/DIAG INJ SC/IM: CPT

## 2019-04-27 PROCEDURE — 81001 URINALYSIS AUTO W/SCOPE: CPT

## 2019-04-27 PROCEDURE — 6360000002 HC RX W HCPCS: Performed by: STUDENT IN AN ORGANIZED HEALTH CARE EDUCATION/TRAINING PROGRAM

## 2019-04-27 PROCEDURE — 76817 TRANSVAGINAL US OBSTETRIC: CPT

## 2019-04-27 PROCEDURE — 6370000000 HC RX 637 (ALT 250 FOR IP): Performed by: STUDENT IN AN ORGANIZED HEALTH CARE EDUCATION/TRAINING PROGRAM

## 2019-04-27 PROCEDURE — 96376 TX/PRO/DX INJ SAME DRUG ADON: CPT

## 2019-04-27 PROCEDURE — 84703 CHORIONIC GONADOTROPIN ASSAY: CPT

## 2019-04-27 RX ORDER — CEPHALEXIN 500 MG/1
500 CAPSULE ORAL 4 TIMES DAILY
Qty: 28 CAPSULE | Refills: 0 | Status: SHIPPED | OUTPATIENT
Start: 2019-04-27 | End: 2019-05-04

## 2019-04-27 RX ORDER — CEPHALEXIN 500 MG/1
500 CAPSULE ORAL ONCE
Status: COMPLETED | OUTPATIENT
Start: 2019-04-27 | End: 2019-04-27

## 2019-04-27 RX ORDER — ONDANSETRON 2 MG/ML
INJECTION INTRAMUSCULAR; INTRAVENOUS
Status: DISCONTINUED
Start: 2019-04-27 | End: 2019-04-27 | Stop reason: HOSPADM

## 2019-04-27 RX ORDER — METRONIDAZOLE 500 MG/1
500 TABLET ORAL 2 TIMES DAILY
Qty: 14 TABLET | Refills: 0 | Status: SHIPPED | OUTPATIENT
Start: 2019-04-27 | End: 2019-05-04

## 2019-04-27 RX ORDER — DIPHENHYDRAMINE HCL 25 MG
25 CAPSULE ORAL EVERY 4 HOURS PRN
Qty: 30 CAPSULE | Refills: 0 | Status: SHIPPED | OUTPATIENT
Start: 2019-04-27 | End: 2019-05-07

## 2019-04-27 RX ORDER — ONDANSETRON 2 MG/ML
4 INJECTION INTRAMUSCULAR; INTRAVENOUS ONCE
Status: COMPLETED | OUTPATIENT
Start: 2019-04-27 | End: 2019-04-27

## 2019-04-27 RX ORDER — LANOLIN ALCOHOL/MO/W.PET/CERES
25 CREAM (GRAM) TOPICAL DAILY
Qty: 30 TABLET | Refills: 3 | Status: SHIPPED | OUTPATIENT
Start: 2019-04-27 | End: 2020-07-18

## 2019-04-27 RX ADMIN — CEFTRIAXONE SODIUM 250 MG: 250 INJECTION, POWDER, FOR SOLUTION INTRAMUSCULAR; INTRAVENOUS at 00:07

## 2019-04-27 RX ADMIN — METRONIDAZOLE 500 MG: 500 TABLET, FILM COATED ORAL at 00:07

## 2019-04-27 RX ADMIN — AZITHROMYCIN 1000 MG: 250 TABLET, FILM COATED ORAL at 00:06

## 2019-04-27 RX ADMIN — ONDANSETRON 4 MG: 2 INJECTION, SOLUTION INTRAMUSCULAR; INTRAVENOUS at 02:21

## 2019-04-27 RX ADMIN — CEPHALEXIN 500 MG: 500 CAPSULE ORAL at 00:37

## 2019-04-27 NOTE — ED PROVIDER NOTES
[address] [facility]     Emergency Department     Faculty Attestation    I performed a history and physical examination of the patient and discussed management with the resident. I reviewed the residents note and agree with the documented findings and plan of care. Any areas of disagreement are noted on the chart. I was personally present for the key portions of any procedures. I have documented in the chart those procedures where I was not present during the key portions. I have reviewed the emergency nurses triage note. I agree with the chief complaint, past medical history, past surgical history, allergies, medications, social and family history as documented unless otherwise noted below. Documentation of the HPI, Physical Exam and Medical Decision Making performed by medical students or scribes is based on my personal performance of the HPI, PE and MDM. For Midlevel providers: I have personally seen and evaluated the patient. I find the patient's history and physical exam are consistent with the NP/PA documentation. I agree with the care provided, treatment rendered, disposition and follow-up plan        Patient with out pain over the past week. Pregnancy status uncertain. We'll get brings it has now. Patient with pain primarily right lower quadrant. Some McBurney's point tenderness. Will consider imaging.       Critical Care          MD Aleida Naik MD  04/26/19 1849
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
## 2019-04-28 LAB
CULTURE: NORMAL
Lab: NORMAL
SPECIMEN DESCRIPTION: NORMAL

## 2019-04-29 LAB
C TRACH DNA GENITAL QL NAA+PROBE: ABNORMAL
N. GONORRHOEAE DNA: NEGATIVE
SPECIMEN DESCRIPTION: ABNORMAL

## 2019-04-30 ENCOUNTER — TELEPHONE (OUTPATIENT)
Dept: PHARMACY | Age: 24
End: 2019-04-30

## 2019-04-30 NOTE — TELEPHONE ENCOUNTER
CLINICAL PHARMACY NOTE:  Telephone Follow-up for Positive STD Test    At the time of Shira Rothman's visit to [facility] on 4/26/19 STD testing was performed. DNA testing was positive for Chlamydia. While in the ED, patient was given azithromycin 1gm orally x1 dose and ceftriaxone 250mg IM x 1 dose. Treatment appropriate, no follow up needed at this time. Instructed patient to abstain from sexual intercourse for 7 days after treatment. Patient also advised to inform any partners that they will need to be tested as well. Patient verbally expressed understanding of above plan. [address] [place] [address]  Ph., CACP, Clinical Pharmacist  Anticoagulation Services, [facility]  4/30/2019  11:57 AM

## 2019-05-28 ENCOUNTER — HOSPITAL ENCOUNTER (EMERGENCY)
Age: 24
Discharge: HOME | End: 2019-05-28
Attending: EMERGENCY MEDICINE
Payer: MEDICARE

## 2019-05-28 VITALS
TEMPERATURE: 98.3 F | RESPIRATION RATE: 18 BRPM | DIASTOLIC BLOOD PRESSURE: 73 MMHG | OXYGEN SATURATION: 100 % | HEART RATE: 81 BPM | SYSTOLIC BLOOD PRESSURE: 114 MMHG

## 2019-05-28 DIAGNOSIS — N39.0 URINARY TRACT INFECTION WITHOUT HEMATURIA, SITE UNSPECIFIED: ICD-10-CM

## 2019-05-28 DIAGNOSIS — O23.591 TRICHOMONAL VAGINITIS DURING PREGNANCY IN FIRST TRIMESTER: ICD-10-CM

## 2019-05-28 DIAGNOSIS — R11.2 NON-INTRACTABLE VOMITING WITH NAUSEA, UNSPECIFIED VOMITING TYPE: Primary | ICD-10-CM

## 2019-05-28 DIAGNOSIS — A59.01 TRICHOMONAL VAGINITIS DURING PREGNANCY IN FIRST TRIMESTER: ICD-10-CM

## 2019-05-28 LAB
-: ABNORMAL
ABSOLUTE EOS #: <0.03 K/UL (ref 0–0.44)
ABSOLUTE IMMATURE GRANULOCYTE: 0.03 K/UL (ref 0–0.3)
ABSOLUTE LYMPH #: 2.01 K/UL (ref 1.1–3.7)
ABSOLUTE MONO #: 0.71 K/UL (ref 0.1–1.2)
ALBUMIN SERPL-MCNC: 4.3 G/DL (ref 3.5–5.2)
ALBUMIN/GLOBULIN RATIO: 1 (ref 1–2.5)
ALP BLD-CCNC: 46 U/L (ref 35–104)
ALT SERPL-CCNC: 7 U/L (ref 5–33)
AMORPHOUS: ABNORMAL
ANION GAP SERPL CALCULATED.3IONS-SCNC: 21 MMOL/L (ref 9–17)
AST SERPL-CCNC: 15 U/L
BACTERIA: ABNORMAL
BASOPHILS # BLD: 0 % (ref 0–2)
BASOPHILS ABSOLUTE: 0.03 K/UL (ref 0–0.2)
BILIRUB SERPL-MCNC: 0.55 MG/DL (ref 0.3–1.2)
BILIRUBIN URINE: NEGATIVE
BUN BLDV-MCNC: 6 MG/DL (ref 6–20)
BUN/CREAT BLD: ABNORMAL (ref 9–20)
CALCIUM SERPL-MCNC: 9.7 MG/DL (ref 8.6–10.4)
CASTS UA: ABNORMAL /LPF (ref 0–2)
CASTS UA: ABNORMAL /LPF (ref 0–2)
CHLORIDE BLD-SCNC: 98 MMOL/L (ref 98–107)
CO2: 11 MMOL/L (ref 20–31)
COLOR: YELLOW
COMMENT UA: ABNORMAL
CREAT SERPL-MCNC: 0.53 MG/DL (ref 0.5–0.9)
CRYSTALS, UA: ABNORMAL /HPF
DIFFERENTIAL TYPE: ABNORMAL
DIRECT EXAM: ABNORMAL
EOSINOPHILS RELATIVE PERCENT: 0 % (ref 1–4)
EPITHELIAL CELLS UA: ABNORMAL /HPF (ref 0–5)
GFR AFRICAN AMERICAN: >60 ML/MIN
GFR NON-AFRICAN AMERICAN: >60 ML/MIN
GFR SERPL CREATININE-BSD FRML MDRD: ABNORMAL ML/MIN/{1.73_M2}
GFR SERPL CREATININE-BSD FRML MDRD: ABNORMAL ML/MIN/{1.73_M2}
GLUCOSE BLD-MCNC: 74 MG/DL (ref 70–99)
GLUCOSE URINE: NEGATIVE
HCT VFR BLD CALC: 40.7 % (ref 36.3–47.1)
HEMOGLOBIN: 13.6 G/DL (ref 11.9–15.1)
HIV AG/AB: NONREACTIVE
IMMATURE GRANULOCYTES: 0 %
KETONES, URINE: ABNORMAL
LEUKOCYTE ESTERASE, URINE: ABNORMAL
LIPASE: 35 U/L (ref 13–60)
LYMPHOCYTES # BLD: 28 % (ref 24–43)
Lab: ABNORMAL
MCH RBC QN AUTO: 29.4 PG (ref 25.2–33.5)
MCHC RBC AUTO-ENTMCNC: 33.4 G/DL (ref 28.4–34.8)
MCV RBC AUTO: 88.1 FL (ref 82.6–102.9)
MONOCYTES # BLD: 10 % (ref 3–12)
MUCUS: ABNORMAL
NITRITE, URINE: NEGATIVE
NRBC AUTOMATED: 0 PER 100 WBC
OTHER OBSERVATIONS UA: ABNORMAL
PDW BLD-RTO: 15.3 % (ref 11.8–14.4)
PH UA: 5.5 (ref 5–8)
PLATELET # BLD: 317 K/UL (ref 138–453)
PLATELET ESTIMATE: ABNORMAL
PMV BLD AUTO: 10 FL (ref 8.1–13.5)
POTASSIUM SERPL-SCNC: 3.5 MMOL/L (ref 3.7–5.3)
PROTEIN UA: ABNORMAL
RBC # BLD: 4.62 M/UL (ref 3.95–5.11)
RBC # BLD: ABNORMAL 10*6/UL
RBC UA: ABNORMAL /HPF (ref 0–4)
RENAL EPITHELIAL, UA: ABNORMAL /HPF
SEG NEUTROPHILS: 62 % (ref 36–65)
SEGMENTED NEUTROPHILS ABSOLUTE COUNT: 4.42 K/UL (ref 1.5–8.1)
SODIUM BLD-SCNC: 130 MMOL/L (ref 135–144)
SPECIFIC GRAVITY UA: 1.03 (ref 1–1.03)
SPECIMEN DESCRIPTION: ABNORMAL
T. PALLIDUM, IGG: NONREACTIVE
TOTAL PROTEIN: 8.8 G/DL (ref 6.4–8.3)
TRICHOMONAS: ABNORMAL
TURBIDITY: CLEAR
URINE HGB: NEGATIVE
UROBILINOGEN, URINE: NORMAL
WBC # BLD: 7.2 K/UL (ref 3.5–11.3)
WBC # BLD: ABNORMAL 10*3/UL
WBC UA: ABNORMAL /HPF (ref 0–5)
YEAST: ABNORMAL

## 2019-05-28 PROCEDURE — 87510 GARDNER VAG DNA DIR PROBE: CPT

## 2019-05-28 PROCEDURE — 87389 HIV-1 AG W/HIV-1&-2 AB AG IA: CPT

## 2019-05-28 PROCEDURE — 96374 THER/PROPH/DIAG INJ IV PUSH: CPT

## 2019-05-28 PROCEDURE — 86780 TREPONEMA PALLIDUM: CPT

## 2019-05-28 PROCEDURE — 83690 ASSAY OF LIPASE: CPT

## 2019-05-28 PROCEDURE — 87591 N.GONORRHOEAE DNA AMP PROB: CPT

## 2019-05-28 PROCEDURE — 6360000002 HC RX W HCPCS: Performed by: EMERGENCY MEDICINE

## 2019-05-28 PROCEDURE — 87660 TRICHOMONAS VAGIN DIR PROBE: CPT

## 2019-05-28 PROCEDURE — 99284 EMERGENCY DEPT VISIT MOD MDM: CPT

## 2019-05-28 PROCEDURE — 96361 HYDRATE IV INFUSION ADD-ON: CPT

## 2019-05-28 PROCEDURE — 87491 CHLMYD TRACH DNA AMP PROBE: CPT

## 2019-05-28 PROCEDURE — 85025 COMPLETE CBC W/AUTO DIFF WBC: CPT

## 2019-05-28 PROCEDURE — 87086 URINE CULTURE/COLONY COUNT: CPT

## 2019-05-28 PROCEDURE — 2580000003 HC RX 258: Performed by: EMERGENCY MEDICINE

## 2019-05-28 PROCEDURE — 81001 URINALYSIS AUTO W/SCOPE: CPT

## 2019-05-28 PROCEDURE — 87480 CANDIDA DNA DIR PROBE: CPT

## 2019-05-28 PROCEDURE — 80053 COMPREHEN METABOLIC PANEL: CPT

## 2019-05-28 RX ORDER — DOXYLAMINE SUCCINATE AND PYRIDOXINE HYDROCHLORIDE, DELAYED RELEASE TABLETS 10 MG/10 MG 10; 10 MG/1; MG/1
TABLET, DELAYED RELEASE ORAL
Qty: 11 TABLET | Refills: 0 | Status: SHIPPED | OUTPATIENT
Start: 2019-05-28 | End: 2020-07-18

## 2019-05-28 RX ORDER — 0.9 % SODIUM CHLORIDE 0.9 %
1000 INTRAVENOUS SOLUTION INTRAVENOUS ONCE
Status: COMPLETED | OUTPATIENT
Start: 2019-05-28 | End: 2019-05-28

## 2019-05-28 RX ORDER — AZITHROMYCIN 200 MG/5ML
1000 POWDER, FOR SUSPENSION ORAL ONCE
Qty: 25 ML | Refills: 0 | Status: SHIPPED | OUTPATIENT
Start: 2019-05-28 | End: 2019-05-28

## 2019-05-28 RX ORDER — CEFTRIAXONE SODIUM 250 MG/1
250 INJECTION, POWDER, FOR SOLUTION INTRAMUSCULAR; INTRAVENOUS ONCE
Status: DISCONTINUED | OUTPATIENT
Start: 2019-05-28 | End: 2019-05-28

## 2019-05-28 RX ORDER — CEPHALEXIN 250 MG/5ML
500 POWDER, FOR SUSPENSION ORAL 4 TIMES DAILY
Qty: 280 ML | Refills: 0 | Status: SHIPPED | OUTPATIENT
Start: 2019-05-28 | End: 2019-06-04

## 2019-05-28 RX ORDER — ONDANSETRON 2 MG/ML
4 INJECTION INTRAMUSCULAR; INTRAVENOUS ONCE
Status: COMPLETED | OUTPATIENT
Start: 2019-05-28 | End: 2019-05-28

## 2019-05-28 RX ADMIN — ONDANSETRON 4 MG: 2 INJECTION INTRAMUSCULAR; INTRAVENOUS at 17:58

## 2019-05-28 RX ADMIN — SODIUM CHLORIDE 1000 ML: 9 INJECTION, SOLUTION INTRAVENOUS at 20:00

## 2019-05-28 RX ADMIN — SODIUM CHLORIDE 1000 ML: 9 INJECTION, SOLUTION INTRAVENOUS at 17:58

## 2019-05-28 ASSESSMENT — ENCOUNTER SYMPTOMS
RHINORRHEA: 0
NAUSEA: 1
COLOR CHANGE: 0
SHORTNESS OF BREATH: 0
COUGH: 0
EYE PAIN: 0
ABDOMINAL PAIN: 1
VOMITING: 1
SORE THROAT: 0

## 2019-05-28 ASSESSMENT — PAIN DESCRIPTION - LOCATION: LOCATION: ABDOMEN

## 2019-05-28 ASSESSMENT — PAIN DESCRIPTION - FREQUENCY: FREQUENCY: INTERMITTENT

## 2019-05-28 ASSESSMENT — PAIN SCALES - GENERAL: PAINLEVEL_OUTOF10: 5

## 2019-05-28 ASSESSMENT — PAIN DESCRIPTION - DESCRIPTORS: DESCRIPTORS: CRAMPING

## 2019-05-28 NOTE — ED NOTES
Pt walked into ER in NAD with steady gait. Pt states she was here 3 weeks ago and was told she is pregnant. Pt states she has been having abdominal pain for 3-4 days that comes and goes and it worse in the mornings. Pt states she has been feeling dehydrated and dizzy for a couple days too. Pt states she has not been eating and has a loss of appetite. Pt states she has small to moderate vaginal bleeding. Pt states vomiting as well and states she thinks she saw blood in some of her vomit. Pt states she thinks she is a couple weeks pregnant but does not know for sure. Pt states she has had a miscarriage before and this seems different. Pt states she is also very weak and tired. Pt resting in bed in NAD with even and unlabored rr. Pt on cardiac monitor.  Will continue to monitor       Eduardo Yeager RN  05/28/19 [id number]

## 2019-05-28 NOTE — ED PROVIDER NOTES
I performed a history and physical examination of the patient and discussed management with the resident. I reviewed the residents note and agree with the documented findings and plan of care. Any areas of disagreement are noted on the chart. I was personally present for the key portions of any procedures. I have documented in the chart those procedures where I was not present during the key portions. I have reviewed the emergency nurses triage note. I agree with the chief complaint, past medical history, past surgical history, allergies, medications, social and family history as documented unless otherwise noted below. Documentation of the HPI, Physical Exam and Medical Decision Making performed by medical students or scribes is based on my personal performance of the HPI, PE and MDM. For Phys Assistant/ Nurse Practitioner cases/documentation I have personally evaluated this patient and have completed at least one if not all key elements of the E/M (history, physical exam, and MDM). I find the patient's history and physical exam are consistent with the NP/PA documentation. I agree with the care provided, treatment rendered, disposition and followup plan. Additional findings are as noted. Jagdeep Koch. Rogelio Jj MD  Attending Emergency  Physician    C/O NAUSEA, VOMITING, LIGHTHEADEDNESS, DECREASED PO INTAKE FOR PAST 1-2WKS. DX'ED WITH 8WK GESTATION ONE MONTH AGO. SOME VAGINAL BLEEDING FOR PAST SEV WEEKS, MOST RECENTLY TWO DAYS AGO. NO DYSURIA, FLANK PAIN, HEMATEMESIS, MELENA,  HEMATOCHEZIA, DIARRHEA, HEMATURIA. H2V4PMS4. HAS NOT YET SCHEDULED PRENATAL VISIT. AWAKE, ALERT, COOP, RESP. LUNGS CLEAR JOSE MARTIN. NO RALES, RHONCHI, WHEEZES, STRIDOR, RETRACTIONS. CARDIAC-S1S2, RRR, NO MRG. ABD SOFT, NONDISTENDED, NONTENDER. NORMAL BOWEL SOUNDS. POCUS-POS FETAL MOVEMENT, SUFFICIENT TO PREVENT MEASUREMENT OF FHR. PREV TVUS RESULTS REVIEWED(19).    VAG PROBE POS FOR [facility] [place] AND BV(2619)  IMP-PREGNANCY,

## 2019-05-28 NOTE — ED NOTES
Sent urine down to lab, pt resting in bed in NAD with even and unlabored rr.  Will continue to monitor       Elza Woods RN  05/28/19 [id number]
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
## 2019-05-28 NOTE — ED NOTES
Pt resting in bed in NAD with even and unlabored rr.  Will continue to monitor       Lenexa Police, RN  05/28/19 [id number]

## 2019-05-29 LAB
C TRACH DNA GENITAL QL NAA+PROBE: NEGATIVE
CULTURE: NORMAL
Lab: NORMAL
N. GONORRHOEAE DNA: NEGATIVE
SPECIMEN DESCRIPTION: NORMAL
SPECIMEN DESCRIPTION: NORMAL

## 2019-07-10 ENCOUNTER — HOSPITAL ENCOUNTER (EMERGENCY)
Age: 24
Discharge: HOME | End: 2019-07-10
Attending: EMERGENCY MEDICINE
Payer: MEDICARE

## 2019-07-10 VITALS
OXYGEN SATURATION: 100 % | TEMPERATURE: 98.5 F | HEART RATE: 89 BPM | RESPIRATION RATE: 16 BRPM | BODY MASS INDEX: 22.71 KG/M2 | DIASTOLIC BLOOD PRESSURE: 78 MMHG | WEIGHT: 145 LBS | SYSTOLIC BLOOD PRESSURE: 124 MMHG

## 2019-07-10 DIAGNOSIS — O09.92 HIGH-RISK PREGNANCY, SECOND TRIMESTER: ICD-10-CM

## 2019-07-10 DIAGNOSIS — R51.9 ACUTE NONINTRACTABLE HEADACHE, UNSPECIFIED HEADACHE TYPE: Primary | ICD-10-CM

## 2019-07-10 PROCEDURE — 96375 TX/PRO/DX INJ NEW DRUG ADDON: CPT

## 2019-07-10 PROCEDURE — 96374 THER/PROPH/DIAG INJ IV PUSH: CPT

## 2019-07-10 PROCEDURE — 6360000002 HC RX W HCPCS: Performed by: PHYSICIAN ASSISTANT

## 2019-07-10 PROCEDURE — 2580000003 HC RX 258: Performed by: PHYSICIAN ASSISTANT

## 2019-07-10 PROCEDURE — 99283 EMERGENCY DEPT VISIT LOW MDM: CPT

## 2019-07-10 RX ORDER — 0.9 % SODIUM CHLORIDE 0.9 %
1000 INTRAVENOUS SOLUTION INTRAVENOUS ONCE
Status: COMPLETED | OUTPATIENT
Start: 2019-07-10 | End: 2019-07-10

## 2019-07-10 RX ORDER — IBUPROFEN 200 MG
1 TABLET ORAL DAILY
Qty: 30 TABLET | Refills: 0 | Status: SHIPPED | OUTPATIENT
Start: 2019-07-10 | End: 2020-07-18

## 2019-07-10 RX ORDER — METOCLOPRAMIDE HYDROCHLORIDE 5 MG/ML
10 INJECTION INTRAMUSCULAR; INTRAVENOUS ONCE
Status: COMPLETED | OUTPATIENT
Start: 2019-07-10 | End: 2019-07-10

## 2019-07-10 RX ORDER — DIPHENHYDRAMINE HYDROCHLORIDE 50 MG/ML
12.5 INJECTION INTRAMUSCULAR; INTRAVENOUS ONCE
Status: COMPLETED | OUTPATIENT
Start: 2019-07-10 | End: 2019-07-10

## 2019-07-10 RX ADMIN — METOCLOPRAMIDE 10 MG: 5 INJECTION, SOLUTION INTRAMUSCULAR; INTRAVENOUS at 12:20

## 2019-07-10 RX ADMIN — SODIUM CHLORIDE 1000 ML: 9 INJECTION, SOLUTION INTRAVENOUS at 12:19

## 2019-07-10 RX ADMIN — DIPHENHYDRAMINE HYDROCHLORIDE 12.5 MG: 50 INJECTION, SOLUTION INTRAMUSCULAR; INTRAVENOUS at 12:20

## 2019-07-10 ASSESSMENT — ENCOUNTER SYMPTOMS
NAUSEA: 0
SORE THROAT: 0
COLOR CHANGE: 0
VOMITING: 0
EYE PAIN: 0
EYE ITCHING: 0
WHEEZING: 0
COUGH: 0
BACK PAIN: 0
EYE DISCHARGE: 0
RHINORRHEA: 0

## 2019-07-10 ASSESSMENT — PAIN DESCRIPTION - DESCRIPTORS: DESCRIPTORS: ACHING

## 2019-07-10 ASSESSMENT — PAIN DESCRIPTION - LOCATION: LOCATION: HEAD

## 2019-07-10 ASSESSMENT — PAIN DESCRIPTION - PAIN TYPE: TYPE: ACUTE PAIN

## 2019-07-10 ASSESSMENT — PAIN SCALES - GENERAL: PAINLEVEL_OUTOF10: 10

## 2019-07-10 NOTE — ED PROVIDER NOTES
Years of education: Not on file    Highest education level: Not on file   Occupational History    Occupation: student   Social Needs    Financial resource strain: Not on file    Food insecurity:     Worry: Not on file     Inability: Not on file    Transportation needs:     Medical: Not on file     Non-medical: Not on file   Tobacco Use    Smoking status: Former Smoker     Types: Cigars    Smokeless tobacco: Never Used    Tobacco comment: \"stopped a couple weeks ago\"   Substance and Sexual Activity    Alcohol use: No     Comment: Pt admits to drinking alot early in her preg. Last used on her bday.  Drug use: Yes     Types: Marijuana    Sexual activity: Not Currently     Partners: Male   Lifestyle    Physical activity:     Days per week: Not on file     Minutes per session: Not on file    Stress: Not on file   Relationships    Social connections:     Talks on phone: Not on file     Gets together: Not on file     Attends Restorationist service: Not on file     Active member of club or organization: Not on file     Attends meetings of clubs or organizations: Not on file     Relationship status: Not on file    Intimate partner violence:     Fear of current or ex partner: Not on file     Emotionally abused: Not on file     Physically abused: Not on file     Forced sexual activity: Not on file   Other Topics Concern    Not on file   Social History Narrative    School: [employer]    Lives with:  Aunt       Family History   Problem Relation Age of Onset    No Known Problems Paternal Grandfather     No Known Problems Paternal Grandmother     No Known Problems Maternal Grandmother     No Known Problems Maternal Grandfather     No Known Problems Father     Heart Defect Mother         \"Hole in the heart\" as a child    Seizures Sister         Epilepsy     Heart Defect Maternal Aunt         \"Hole in the heart,\" unsure of dx, surgically repaired     No Known Problems Brother     No Known Problems Sister    [place] Negative for dysphoric mood. PHYSICAL EXAM  (up to 7 for level 4, 8 or more for level 5)      INITIAL VITALS:  weight is 145 lb (65.8 kg). Her temperature is 98.5 °F (36.9 °C). Her blood pressure is 124/78 and her pulse is 89. Her respiration is 16 and oxygen saturation is 100%. Physical Exam   Constitutional: She is oriented to person, place, and time. She appears well-developed and well-nourished. HENT:   Head: Normocephalic and atraumatic. Right Ear: External ear normal.   Left Ear: External ear normal.   Eyes: Pupils are equal, round, and reactive to light. Conjunctivae and EOM are normal. Right eye exhibits no discharge. Left eye exhibits no discharge. No scleral icterus. Neck: Normal range of motion. No tracheal deviation present. Cardiovascular: Normal rate, regular rhythm and normal heart sounds. Exam reveals no gallop. No murmur heard. Pulmonary/Chest: Effort normal and breath sounds normal. No stridor. No respiratory distress. Abdominal: There is no tenderness. There is no rebound and no guarding. Musculoskeletal: Normal range of motion. She exhibits no edema. Neurological: She is alert and oriented to person, place, and time. She has normal strength. No cranial nerve deficit or sensory deficit. Coordination normal. GCS eye subscore is 4. GCS verbal subscore is 5. GCS motor subscore is 6. Skin: Skin is warm and dry. No rash (on exposed surfaces) noted. She is not diaphoretic. No pallor. Psychiatric: She has a normal mood and affect.  Her behavior is normal.       DIFFERENTIAL  DIAGNOSIS       SAH, subdural hematoma, epidural, intracerebral, meningitis, encephalitis, migraine, tension, cluster, sinusitis, dental, stroke, encephalitis, abscess, CNS mass, increased ICP, venous thrombosis, carbon monoxide poisoning, acute angle closure glaucoma, temporal arteritis, idiopathic intracranial hypertension        PLAN (LABS / IMAGING / EKG):  No orders of the defined types were placed

## 2019-08-01 ENCOUNTER — TELEPHONE (OUTPATIENT)
Dept: OBGYN | Age: 24
End: 2019-08-01

## 2019-08-19 ENCOUNTER — TELEPHONE (OUTPATIENT)
Dept: OBGYN | Age: 24
End: 2019-08-19

## 2019-08-19 NOTE — TELEPHONE ENCOUNTER
Patient called office to scheduled appt for prenatal care. Patient states that she has not had any prenatal care during this pregnancy. US done on 4/27/19 CHELSIE 12/7/19.  Appt for ob edu scheduled for 9/16/19 at 11am. Please advise if patient should be scheduled with provider

## 2019-09-16 ENCOUNTER — TELEPHONE (OUTPATIENT)
Dept: OBGYN | Age: 24
End: 2019-09-16

## 2019-09-17 ENCOUNTER — TELEPHONE (OUTPATIENT)
Dept: OBGYN | Age: 24
End: 2019-09-17

## 2019-09-20 ENCOUNTER — TELEPHONE (OUTPATIENT)
Dept: OBGYN | Age: 24
End: 2019-09-20

## 2019-10-08 ENCOUNTER — HOSPITAL ENCOUNTER (OUTPATIENT)
Age: 24
Discharge: HOME | End: 2019-10-09
Attending: OBSTETRICS & GYNECOLOGY | Admitting: OBSTETRICS & GYNECOLOGY
Payer: MEDICARE

## 2019-10-08 VITALS
RESPIRATION RATE: 16 BRPM | SYSTOLIC BLOOD PRESSURE: 119 MMHG | TEMPERATURE: 97.9 F | HEART RATE: 101 BPM | DIASTOLIC BLOOD PRESSURE: 70 MMHG

## 2019-10-08 PROBLEM — O26.899 ABDOMINAL PAIN AFFECTING PREGNANCY: Status: ACTIVE | Noted: 2019-10-08

## 2019-10-08 PROBLEM — A59.01 TRICHOMONAL VAGINITIS DURING PREGNANCY: Status: ACTIVE | Noted: 2019-10-08

## 2019-10-08 PROBLEM — O09.30 NO PRENATAL CARE IN CURRENT PREGNANCY: Status: ACTIVE | Noted: 2019-10-08

## 2019-10-08 PROBLEM — O23.599 TRICHOMONAL VAGINITIS DURING PREGNANCY: Status: ACTIVE | Noted: 2019-10-08

## 2019-10-08 PROBLEM — R10.9 ABDOMINAL PAIN AFFECTING PREGNANCY: Status: ACTIVE | Noted: 2019-10-08

## 2019-10-08 PROCEDURE — 87086 URINE CULTURE/COLONY COUNT: CPT

## 2019-10-08 PROCEDURE — 87480 CANDIDA DNA DIR PROBE: CPT

## 2019-10-08 PROCEDURE — 84156 ASSAY OF PROTEIN URINE: CPT

## 2019-10-08 PROCEDURE — 87510 GARDNER VAG DNA DIR PROBE: CPT

## 2019-10-08 PROCEDURE — 82570 ASSAY OF URINE CREATININE: CPT

## 2019-10-08 PROCEDURE — 81001 URINALYSIS AUTO W/SCOPE: CPT

## 2019-10-08 PROCEDURE — 87660 TRICHOMONAS VAGIN DIR PROBE: CPT

## 2019-10-09 PROBLEM — O98.811 CHLAMYDIA INFECTION AFFECTING PREGNANCY, ANTEPARTUM, FIRST TRIMESTER: Status: ACTIVE | Noted: 2019-10-09

## 2019-10-09 PROBLEM — A74.9 CHLAMYDIA INFECTION AFFECTING PREGNANCY, ANTEPARTUM, FIRST TRIMESTER: Status: ACTIVE | Noted: 2019-10-09

## 2019-10-09 PROBLEM — O12.13 PROTEINURIA AFFECTING PREGNANCY IN THIRD TRIMESTER: Status: ACTIVE | Noted: 2019-10-09

## 2019-10-09 LAB
-: ABNORMAL
ABO/RH: NORMAL
ABSOLUTE EOS #: 0.08 K/UL (ref 0–0.44)
ABSOLUTE IMMATURE GRANULOCYTE: 0.1 K/UL (ref 0–0.3)
ABSOLUTE LYMPH #: 1.86 K/UL (ref 1.1–3.7)
ABSOLUTE MONO #: 0.89 K/UL (ref 0.1–1.2)
ALBUMIN SERPL-MCNC: 3.4 G/DL (ref 3.5–5.2)
ALBUMIN/GLOBULIN RATIO: 1 (ref 1–2.5)
ALP BLD-CCNC: 122 U/L (ref 35–104)
ALT SERPL-CCNC: 5 U/L (ref 5–33)
AMORPHOUS: ABNORMAL
ANION GAP SERPL CALCULATED.3IONS-SCNC: 12 MMOL/L (ref 9–17)
ANTIBODY SCREEN: NEGATIVE
ARM BAND NUMBER: NORMAL
AST SERPL-CCNC: 13 U/L
BACTERIA: ABNORMAL
BASOPHILS # BLD: 0 % (ref 0–2)
BASOPHILS ABSOLUTE: 0.03 K/UL (ref 0–0.2)
BILIRUB SERPL-MCNC: 0.35 MG/DL (ref 0.3–1.2)
BILIRUBIN URINE: NEGATIVE
BUN BLDV-MCNC: 8 MG/DL (ref 6–20)
BUN/CREAT BLD: ABNORMAL (ref 9–20)
C TRACH DNA GENITAL QL NAA+PROBE: NEGATIVE
CALCIUM SERPL-MCNC: 8.4 MG/DL (ref 8.6–10.4)
CASTS UA: ABNORMAL /LPF (ref 0–8)
CHLORIDE BLD-SCNC: 102 MMOL/L (ref 98–107)
CO2: 21 MMOL/L (ref 20–31)
COLOR: YELLOW
COMMENT UA: ABNORMAL
CREAT SERPL-MCNC: 0.35 MG/DL (ref 0.5–0.9)
CREATININE URINE: 288 MG/DL (ref 28–217)
CRYSTALS, UA: ABNORMAL /HPF
DIFFERENTIAL TYPE: ABNORMAL
DIRECT EXAM: ABNORMAL
EOSINOPHILS RELATIVE PERCENT: 1 % (ref 1–4)
EPITHELIAL CELLS UA: ABNORMAL /HPF (ref 0–5)
EXPIRATION DATE: NORMAL
GFR AFRICAN AMERICAN: >60 ML/MIN
GFR NON-AFRICAN AMERICAN: >60 ML/MIN
GFR SERPL CREATININE-BSD FRML MDRD: ABNORMAL ML/MIN/{1.73_M2}
GFR SERPL CREATININE-BSD FRML MDRD: ABNORMAL ML/MIN/{1.73_M2}
GLUCOSE BLD-MCNC: 95 MG/DL (ref 70–99)
GLUCOSE URINE: NEGATIVE
HCT VFR BLD CALC: 29 % (ref 36.3–47.1)
HEMOGLOBIN: 9.2 G/DL (ref 11.9–15.1)
HEPATITIS B SURFACE ANTIGEN: NONREACTIVE
IMMATURE GRANULOCYTES: 1 %
KETONES, URINE: ABNORMAL
LACTATE DEHYDROGENASE: 108 U/L (ref 135–214)
LEUKOCYTE ESTERASE, URINE: ABNORMAL
LYMPHOCYTES # BLD: 24 % (ref 24–43)
Lab: ABNORMAL
MCH RBC QN AUTO: 30.4 PG (ref 25.2–33.5)
MCHC RBC AUTO-ENTMCNC: 31.7 G/DL (ref 28.4–34.8)
MCV RBC AUTO: 95.7 FL (ref 82.6–102.9)
MONOCYTES # BLD: 11 % (ref 3–12)
MUCUS: ABNORMAL
N. GONORRHOEAE DNA: NEGATIVE
NITRITE, URINE: NEGATIVE
NRBC AUTOMATED: 0 PER 100 WBC
OTHER OBSERVATIONS UA: ABNORMAL
PDW BLD-RTO: 13.5 % (ref 11.8–14.4)
PH UA: 7 (ref 5–8)
PLATELET # BLD: 199 K/UL (ref 138–453)
PLATELET ESTIMATE: ABNORMAL
PMV BLD AUTO: 11 FL (ref 8.1–13.5)
POTASSIUM SERPL-SCNC: 3.4 MMOL/L (ref 3.7–5.3)
PROTEIN UA: ABNORMAL
RBC # BLD: 3.03 M/UL (ref 3.95–5.11)
RBC # BLD: ABNORMAL 10*6/UL
RBC UA: ABNORMAL /HPF (ref 0–4)
RENAL EPITHELIAL, UA: ABNORMAL /HPF
RUBV IGG SER QL: 96.9 IU/ML
SEG NEUTROPHILS: 63 % (ref 36–65)
SEGMENTED NEUTROPHILS ABSOLUTE COUNT: 4.84 K/UL (ref 1.5–8.1)
SODIUM BLD-SCNC: 135 MMOL/L (ref 135–144)
SPECIFIC GRAVITY UA: 1.02 (ref 1–1.03)
SPECIMEN DESCRIPTION: ABNORMAL
SPECIMEN DESCRIPTION: NORMAL
TOTAL PROTEIN, URINE: 81 MG/DL
TOTAL PROTEIN: 6.8 G/DL (ref 6.4–8.3)
TRICHOMONAS: ABNORMAL
TURBIDITY: CLEAR
URIC ACID: 2.9 MG/DL (ref 2.4–5.7)
URINE HGB: NEGATIVE
URINE TOTAL PROTEIN CREATININE RATIO: 0.28 (ref 0–0.2)
UROBILINOGEN, URINE: NORMAL
WBC # BLD: 7.8 K/UL (ref 3.5–11.3)
WBC # BLD: ABNORMAL 10*3/UL
WBC UA: ABNORMAL /HPF (ref 0–5)
YEAST: ABNORMAL

## 2019-10-09 PROCEDURE — 85025 COMPLETE CBC W/AUTO DIFF WBC: CPT

## 2019-10-09 PROCEDURE — 36415 COLL VENOUS BLD VENIPUNCTURE: CPT

## 2019-10-09 PROCEDURE — 80053 COMPREHEN METABOLIC PANEL: CPT

## 2019-10-09 PROCEDURE — 87591 N.GONORRHOEAE DNA AMP PROB: CPT

## 2019-10-09 PROCEDURE — 86901 BLOOD TYPING SEROLOGIC RH(D): CPT

## 2019-10-09 PROCEDURE — 86762 RUBELLA ANTIBODY: CPT

## 2019-10-09 PROCEDURE — 86900 BLOOD TYPING SEROLOGIC ABO: CPT

## 2019-10-09 PROCEDURE — 99213 OFFICE O/P EST LOW 20 MIN: CPT

## 2019-10-09 PROCEDURE — 87491 CHLMYD TRACH DNA AMP PROBE: CPT

## 2019-10-09 PROCEDURE — 86850 RBC ANTIBODY SCREEN: CPT

## 2019-10-09 PROCEDURE — 83615 LACTATE (LD) (LDH) ENZYME: CPT

## 2019-10-09 PROCEDURE — 87340 HEPATITIS B SURFACE AG IA: CPT

## 2019-10-09 PROCEDURE — 84550 ASSAY OF BLOOD/URIC ACID: CPT

## 2019-10-09 PROCEDURE — 6370000000 HC RX 637 (ALT 250 FOR IP): Performed by: STUDENT IN AN ORGANIZED HEALTH CARE EDUCATION/TRAINING PROGRAM

## 2019-10-09 RX ORDER — LANOLIN ALCOHOL/MO/W.PET/CERES
325 CREAM (GRAM) TOPICAL 2 TIMES DAILY
Qty: 90 TABLET | Refills: 3 | Status: ON HOLD | OUTPATIENT
Start: 2019-10-09 | End: 2019-12-04 | Stop reason: HOSPADM

## 2019-10-09 RX ORDER — METRONIDAZOLE 7.5 MG/G
GEL VAGINAL
Qty: 1 TUBE | Refills: 0 | Status: SHIPPED | OUTPATIENT
Start: 2019-10-09 | End: 2019-10-16

## 2019-10-09 RX ORDER — CEPHALEXIN 250 MG/5ML
500 POWDER, FOR SUSPENSION ORAL 4 TIMES DAILY
Qty: 280 ML | Refills: 0 | Status: SHIPPED | OUTPATIENT
Start: 2019-10-09 | End: 2019-10-16

## 2019-10-09 RX ORDER — ACETAMINOPHEN 500 MG
1000 TABLET ORAL EVERY 6 HOURS PRN
Status: DISCONTINUED | OUTPATIENT
Start: 2019-10-09 | End: 2019-10-09

## 2019-10-09 RX ORDER — ACETAMINOPHEN 160 MG/5ML
1000 SOLUTION ORAL EVERY 6 HOURS PRN
Status: DISCONTINUED | OUTPATIENT
Start: 2019-10-09 | End: 2019-10-09 | Stop reason: HOSPADM

## 2019-10-09 RX ADMIN — ACETAMINOPHEN 1000 MG: 650 SOLUTION ORAL at 01:32

## 2019-10-09 ASSESSMENT — PAIN SCALES - GENERAL: PAINLEVEL_OUTOF10: 4

## 2019-10-10 LAB
CULTURE: NO GROWTH
Lab: NORMAL
SPECIMEN DESCRIPTION: NORMAL

## 2019-11-05 ENCOUNTER — TELEPHONE (OUTPATIENT)
Dept: ADMINISTRATIVE | Age: 24
End: 2019-11-05

## 2019-11-18 ENCOUNTER — ROUTINE PRENATAL (OUTPATIENT)
Dept: OBGYN | Age: 24
End: 2019-11-18
Payer: MEDICARE

## 2019-11-18 ENCOUNTER — HOSPITAL ENCOUNTER (OUTPATIENT)
Age: 24
Setting detail: SPECIMEN
Discharge: HOME | End: 2019-11-18
Payer: MEDICARE

## 2019-11-18 VITALS
HEIGHT: 67 IN | HEART RATE: 101 BPM | BODY MASS INDEX: 24.83 KG/M2 | SYSTOLIC BLOOD PRESSURE: 125 MMHG | DIASTOLIC BLOOD PRESSURE: 66 MMHG | WEIGHT: 158.2 LBS

## 2019-11-18 DIAGNOSIS — O09.93 HIGH-RISK PREGNANCY IN THIRD TRIMESTER: ICD-10-CM

## 2019-11-18 DIAGNOSIS — Z82.79 FAMILY HISTORY OF CONGENITAL HEART DISEASE: ICD-10-CM

## 2019-11-18 DIAGNOSIS — A74.9 CHLAMYDIA INFECTION AFFECTING PREGNANCY, ANTEPARTUM, FIRST TRIMESTER: ICD-10-CM

## 2019-11-18 DIAGNOSIS — Z3A.37 37 WEEKS GESTATION OF PREGNANCY: Primary | ICD-10-CM

## 2019-11-18 DIAGNOSIS — O09.33 NO PRENATAL CARE IN CURRENT PREGNANCY IN THIRD TRIMESTER: ICD-10-CM

## 2019-11-18 DIAGNOSIS — O23.599 TRICHOMONAL VAGINITIS DURING PREGNANCY, ANTEPARTUM: ICD-10-CM

## 2019-11-18 DIAGNOSIS — A59.01 TRICHOMONAL VAGINITIS DURING PREGNANCY, ANTEPARTUM: ICD-10-CM

## 2019-11-18 DIAGNOSIS — O98.811 CHLAMYDIA INFECTION AFFECTING PREGNANCY, ANTEPARTUM, FIRST TRIMESTER: ICD-10-CM

## 2019-11-18 DIAGNOSIS — O09.93 HIGH-RISK PREGNANCY, THIRD TRIMESTER: Primary | ICD-10-CM

## 2019-11-18 DIAGNOSIS — Z3A.37 37 WEEKS GESTATION OF PREGNANCY: ICD-10-CM

## 2019-11-18 PROBLEM — N94.6 DYSMENORRHEA: Status: ACTIVE | Noted: 2019-11-18

## 2019-11-18 PROCEDURE — 99212 OFFICE O/P EST SF 10 MIN: CPT | Performed by: STUDENT IN AN ORGANIZED HEALTH CARE EDUCATION/TRAINING PROGRAM

## 2019-11-18 PROCEDURE — 99213 OFFICE O/P EST LOW 20 MIN: CPT | Performed by: STUDENT IN AN ORGANIZED HEALTH CARE EDUCATION/TRAINING PROGRAM

## 2019-11-18 ASSESSMENT — PATIENT HEALTH QUESTIONNAIRE - PHQ9
SUM OF ALL RESPONSES TO PHQ9 QUESTIONS 1 & 2: 0
2. FEELING DOWN, DEPRESSED OR HOPELESS: 0
SUM OF ALL RESPONSES TO PHQ QUESTIONS 1-9: 0
SUM OF ALL RESPONSES TO PHQ QUESTIONS 1-9: 0
1. LITTLE INTEREST OR PLEASURE IN DOING THINGS: 0

## 2019-11-19 LAB
C TRACH DNA GENITAL QL NAA+PROBE: NEGATIVE
DIRECT EXAM: NORMAL
Lab: NORMAL
N. GONORRHOEAE DNA: NEGATIVE
SPECIMEN DESCRIPTION: NORMAL
SPECIMEN DESCRIPTION: NORMAL

## 2019-11-20 ENCOUNTER — ROUTINE PRENATAL (OUTPATIENT)
Dept: PERINATAL CARE | Age: 24
End: 2019-11-20
Payer: MEDICARE

## 2019-11-20 VITALS
BODY MASS INDEX: 25.43 KG/M2 | HEIGHT: 67 IN | RESPIRATION RATE: 16 BRPM | TEMPERATURE: 98.2 F | DIASTOLIC BLOOD PRESSURE: 64 MMHG | HEART RATE: 82 BPM | WEIGHT: 162 LBS | SYSTOLIC BLOOD PRESSURE: 114 MMHG

## 2019-11-20 DIAGNOSIS — O35.2XX0 HEREDITARY FAMILIAL DISEASE AFFECTING MANAGEMENT OF MOTHER AND POSSIBLY AFFECTING FETUS, ANTEPARTUM, SINGLE OR UNSPECIFIED FETUS: ICD-10-CM

## 2019-11-20 DIAGNOSIS — O35.BXX0 ANOMALY OF HEART OF FETUS AFFECTING PREGNANCY, ANTEPARTUM, SINGLE OR UNSPECIFIED FETUS: ICD-10-CM

## 2019-11-20 DIAGNOSIS — Z3A.37 37 WEEKS GESTATION OF PREGNANCY: ICD-10-CM

## 2019-11-20 DIAGNOSIS — O09.33 INSUFFICIENT PRENATAL CARE IN THIRD TRIMESTER: ICD-10-CM

## 2019-11-20 DIAGNOSIS — O09.899 SHORT INTERVAL BETWEEN PREGNANCIES COMPLICATING PREGNANCY, ANTEPARTUM: ICD-10-CM

## 2019-11-20 PROCEDURE — 76821 MIDDLE CEREBRAL ARTERY ECHO: CPT | Performed by: OBSTETRICS & GYNECOLOGY

## 2019-11-20 PROCEDURE — 99211 OFF/OP EST MAY X REQ PHY/QHP: CPT | Performed by: OBSTETRICS & GYNECOLOGY

## 2019-11-20 PROCEDURE — G8427 DOCREV CUR MEDS BY ELIG CLIN: HCPCS | Performed by: OBSTETRICS & GYNECOLOGY

## 2019-11-20 PROCEDURE — 76811 OB US DETAILED SNGL FETUS: CPT | Performed by: OBSTETRICS & GYNECOLOGY

## 2019-11-20 PROCEDURE — 76819 FETAL BIOPHYS PROFIL W/O NST: CPT | Performed by: OBSTETRICS & GYNECOLOGY

## 2019-11-20 PROCEDURE — 76820 UMBILICAL ARTERY ECHO: CPT | Performed by: OBSTETRICS & GYNECOLOGY

## 2019-11-20 PROCEDURE — G8419 CALC BMI OUT NRM PARAM NOF/U: HCPCS | Performed by: OBSTETRICS & GYNECOLOGY

## 2019-11-21 LAB
CULTURE: NORMAL
Lab: NORMAL
SPECIMEN DESCRIPTION: NORMAL

## 2019-11-22 ENCOUNTER — HOSPITAL ENCOUNTER (OUTPATIENT)
Age: 24
Discharge: HOME | End: 2019-11-22
Payer: MEDICARE

## 2019-11-25 ENCOUNTER — TELEPHONE (OUTPATIENT)
Dept: OBGYN | Age: 24
End: 2019-11-25

## 2019-11-25 PROBLEM — O35.BXX0 FETAL CARDIAC ANOMALY AFFECTING PREGNANCY, ANTEPARTUM: Status: ACTIVE | Noted: 2019-11-25

## 2019-11-25 PROBLEM — O09.90 HIGH-RISK PREGNANCY: Status: ACTIVE | Noted: 2019-11-25

## 2019-11-26 DIAGNOSIS — O35.BXX0 ANOMALY OF HEART OF FETUS AFFECTING PREGNANCY, ANTEPARTUM, SINGLE OR UNSPECIFIED FETUS: ICD-10-CM

## 2019-12-03 ENCOUNTER — HOSPITAL ENCOUNTER (INPATIENT)
Age: 24
LOS: 3 days | Discharge: HOME | DRG: 560 | End: 2019-12-06
Attending: OBSTETRICS & GYNECOLOGY | Admitting: OBSTETRICS & GYNECOLOGY
Payer: MEDICARE

## 2019-12-03 PROBLEM — D64.9 ANEMIA: Status: ACTIVE | Noted: 2019-12-03

## 2019-12-03 PROBLEM — Z91.199 NONCOMPLIANCE: Status: ACTIVE | Noted: 2019-12-03

## 2019-12-03 PROBLEM — O09.893 SHORT INTERVAL BETWEEN PREGNANCIES AFFECTING PREGNANCY IN THIRD TRIMESTER, ANTEPARTUM: Status: ACTIVE | Noted: 2019-12-03

## 2019-12-03 PROBLEM — Z3A.39 39 WEEKS GESTATION OF PREGNANCY: Status: ACTIVE | Noted: 2019-12-03

## 2019-12-03 PROBLEM — Z87.59 HISTORY OF SPONTANEOUS ABORTION: Status: ACTIVE | Noted: 2019-12-03

## 2019-12-03 LAB
-: ABNORMAL
ABO/RH: NORMAL
ABSOLUTE EOS #: <0.03 K/UL (ref 0–0.44)
ABSOLUTE IMMATURE GRANULOCYTE: 0.05 K/UL (ref 0–0.3)
ABSOLUTE LYMPH #: 2.07 K/UL (ref 1.1–3.7)
ABSOLUTE MONO #: 0.65 K/UL (ref 0.1–1.2)
AMORPHOUS: ABNORMAL
AMPHETAMINE SCREEN URINE: NEGATIVE
ANTIBODY SCREEN: NEGATIVE
ARM BAND NUMBER: NORMAL
BACTERIA: ABNORMAL
BARBITURATE SCREEN URINE: NEGATIVE
BASOPHILS # BLD: 0 % (ref 0–2)
BASOPHILS ABSOLUTE: <0.03 K/UL (ref 0–0.2)
BENZODIAZEPINE SCREEN, URINE: NEGATIVE
BILIRUBIN URINE: NEGATIVE
BUPRENORPHINE URINE: ABNORMAL
CANNABINOID SCREEN URINE: POSITIVE
CASTS UA: ABNORMAL /LPF (ref 0–8)
COCAINE METABOLITE, URINE: NEGATIVE
COLOR: YELLOW
CRYSTALS, UA: ABNORMAL /HPF
DIFFERENTIAL TYPE: ABNORMAL
EOSINOPHILS RELATIVE PERCENT: 0 % (ref 1–4)
EPITHELIAL CELLS UA: ABNORMAL /HPF (ref 0–5)
EXPIRATION DATE: NORMAL
FERRITIN: 6 UG/L (ref 13–150)
GLUCOSE BLD-MCNC: 66 MG/DL (ref 65–105)
GLUCOSE URINE: NEGATIVE
HCT VFR BLD CALC: 29.3 % (ref 36.3–47.1)
HEMOGLOBIN: 9.3 G/DL (ref 11.9–15.1)
IMMATURE GRANULOCYTES: 1 %
IRON SATURATION: 6 % (ref 20–55)
IRON: 31 UG/DL (ref 37–145)
KETONES, URINE: NEGATIVE
LEUKOCYTE ESTERASE, URINE: ABNORMAL
LYMPHOCYTES # BLD: 28 % (ref 24–43)
MCH RBC QN AUTO: 28.4 PG (ref 25.2–33.5)
MCHC RBC AUTO-ENTMCNC: 31.7 G/DL (ref 28.4–34.8)
MCV RBC AUTO: 89.3 FL (ref 82.6–102.9)
MDMA URINE: ABNORMAL
METHADONE SCREEN, URINE: NEGATIVE
METHAMPHETAMINE, URINE: ABNORMAL
MONOCYTES # BLD: 9 % (ref 3–12)
MUCUS: ABNORMAL
NITRITE, URINE: NEGATIVE
NRBC AUTOMATED: 0 PER 100 WBC
OPIATES, URINE: NEGATIVE
OTHER OBSERVATIONS UA: ABNORMAL
OXYCODONE SCREEN URINE: NEGATIVE
PDW BLD-RTO: 14.6 % (ref 11.8–14.4)
PH UA: 8 (ref 5–8)
PHENCYCLIDINE, URINE: NEGATIVE
PLATELET # BLD: 210 K/UL (ref 138–453)
PLATELET ESTIMATE: ABNORMAL
PMV BLD AUTO: 10.6 FL (ref 8.1–13.5)
PROPOXYPHENE, URINE: ABNORMAL
PROTEIN UA: NEGATIVE
RBC # BLD: 3.28 M/UL (ref 3.95–5.11)
RBC # BLD: ABNORMAL 10*6/UL
RBC UA: ABNORMAL /HPF (ref 0–4)
RENAL EPITHELIAL, UA: ABNORMAL /HPF
SEG NEUTROPHILS: 62 % (ref 36–65)
SEGMENTED NEUTROPHILS ABSOLUTE COUNT: 4.49 K/UL (ref 1.5–8.1)
SPECIFIC GRAVITY UA: 1.01 (ref 1–1.03)
T. PALLIDUM, IGG: NONREACTIVE
TEST INFORMATION: ABNORMAL
TOTAL IRON BINDING CAPACITY: 557 UG/DL (ref 250–450)
TOXOPLASM IGM: 0.23 INDEX
TOXOPLASMA BLOOD FOR RATIO: <0.5 IU/ML
TRICHOMONAS: ABNORMAL
TRICYCLIC ANTIDEPRESSANTS, UR: ABNORMAL
TURBIDITY: CLEAR
UNSATURATED IRON BINDING CAPACITY: 526 UG/DL (ref 112–347)
URINE HGB: NEGATIVE
UROBILINOGEN, URINE: NORMAL
WBC # BLD: 7.3 K/UL (ref 3.5–11.3)
WBC # BLD: ABNORMAL 10*3/UL
WBC UA: ABNORMAL /HPF (ref 0–5)
YEAST: ABNORMAL

## 2019-12-03 PROCEDURE — 86658 ENTEROVIRUS ANTIBODY: CPT

## 2019-12-03 PROCEDURE — 1220000000 HC SEMI PRIVATE OB R&B

## 2019-12-03 PROCEDURE — 83540 ASSAY OF IRON: CPT

## 2019-12-03 PROCEDURE — 3E033VJ INTRODUCTION OF OTHER HORMONE INTO PERIPHERAL VEIN, PERCUTANEOUS APPROACH: ICD-10-PCS | Performed by: STUDENT IN AN ORGANIZED HEALTH CARE EDUCATION/TRAINING PROGRAM

## 2019-12-03 PROCEDURE — 2580000003 HC RX 258: Performed by: STUDENT IN AN ORGANIZED HEALTH CARE EDUCATION/TRAINING PROGRAM

## 2019-12-03 PROCEDURE — 86850 RBC ANTIBODY SCREEN: CPT

## 2019-12-03 PROCEDURE — 82947 ASSAY GLUCOSE BLOOD QUANT: CPT

## 2019-12-03 PROCEDURE — 86780 TREPONEMA PALLIDUM: CPT

## 2019-12-03 PROCEDURE — 86645 CMV ANTIBODY IGM: CPT

## 2019-12-03 PROCEDURE — 99222 1ST HOSP IP/OBS MODERATE 55: CPT | Performed by: OBSTETRICS & GYNECOLOGY

## 2019-12-03 PROCEDURE — 86747 PARVOVIRUS ANTIBODY: CPT

## 2019-12-03 PROCEDURE — 86900 BLOOD TYPING SEROLOGIC ABO: CPT

## 2019-12-03 PROCEDURE — 81001 URINALYSIS AUTO W/SCOPE: CPT

## 2019-12-03 PROCEDURE — 83550 IRON BINDING TEST: CPT

## 2019-12-03 PROCEDURE — 86778 TOXOPLASMA ANTIBODY IGM: CPT

## 2019-12-03 PROCEDURE — 80307 DRUG TEST PRSMV CHEM ANLYZR: CPT

## 2019-12-03 PROCEDURE — 86777 TOXOPLASMA ANTIBODY: CPT

## 2019-12-03 PROCEDURE — 86644 CMV ANTIBODY: CPT

## 2019-12-03 PROCEDURE — 36415 COLL VENOUS BLD VENIPUNCTURE: CPT

## 2019-12-03 PROCEDURE — 87086 URINE CULTURE/COLONY COUNT: CPT

## 2019-12-03 PROCEDURE — 85025 COMPLETE CBC W/AUTO DIFF WBC: CPT

## 2019-12-03 PROCEDURE — 82728 ASSAY OF FERRITIN: CPT

## 2019-12-03 PROCEDURE — 6360000002 HC RX W HCPCS: Performed by: STUDENT IN AN ORGANIZED HEALTH CARE EDUCATION/TRAINING PROGRAM

## 2019-12-03 PROCEDURE — 86901 BLOOD TYPING SEROLOGIC RH(D): CPT

## 2019-12-03 RX ORDER — SODIUM CHLORIDE 0.9 % (FLUSH) 0.9 %
10 SYRINGE (ML) INJECTION PRN
Status: DISCONTINUED | OUTPATIENT
Start: 2019-12-03 | End: 2019-12-04

## 2019-12-03 RX ORDER — SODIUM CHLORIDE, SODIUM LACTATE, POTASSIUM CHLORIDE, CALCIUM CHLORIDE 600; 310; 30; 20 MG/100ML; MG/100ML; MG/100ML; MG/100ML
INJECTION, SOLUTION INTRAVENOUS CONTINUOUS
Status: DISCONTINUED | OUTPATIENT
Start: 2019-12-03 | End: 2019-12-04

## 2019-12-03 RX ORDER — DIPHENHYDRAMINE HCL 25 MG
25 TABLET ORAL EVERY 4 HOURS PRN
Status: DISCONTINUED | OUTPATIENT
Start: 2019-12-03 | End: 2019-12-04

## 2019-12-03 RX ORDER — ACETAMINOPHEN 500 MG
1000 TABLET ORAL ONCE
Status: DISCONTINUED | OUTPATIENT
Start: 2019-12-03 | End: 2019-12-04

## 2019-12-03 RX ORDER — ACETAMINOPHEN 500 MG
1000 TABLET ORAL EVERY 6 HOURS PRN
Status: DISCONTINUED | OUTPATIENT
Start: 2019-12-03 | End: 2019-12-04

## 2019-12-03 RX ORDER — SODIUM CHLORIDE 0.9 % (FLUSH) 0.9 %
10 SYRINGE (ML) INJECTION EVERY 12 HOURS SCHEDULED
Status: DISCONTINUED | OUTPATIENT
Start: 2019-12-03 | End: 2019-12-04

## 2019-12-03 RX ORDER — ONDANSETRON 2 MG/ML
4 INJECTION INTRAMUSCULAR; INTRAVENOUS EVERY 6 HOURS PRN
Status: DISCONTINUED | OUTPATIENT
Start: 2019-12-03 | End: 2019-12-04 | Stop reason: SDUPTHER

## 2019-12-03 RX ADMIN — SODIUM CHLORIDE, POTASSIUM CHLORIDE, SODIUM LACTATE AND CALCIUM CHLORIDE: 600; 310; 30; 20 INJECTION, SOLUTION INTRAVENOUS at 19:00

## 2019-12-03 RX ADMIN — Medication 1 MILLI-UNITS/MIN: at 19:30

## 2019-12-04 ENCOUNTER — ANESTHESIA (OUTPATIENT)
Dept: LABOR AND DELIVERY | Age: 24
DRG: 560 | End: 2019-12-04
Payer: MEDICARE

## 2019-12-04 ENCOUNTER — ANESTHESIA EVENT (OUTPATIENT)
Dept: LABOR AND DELIVERY | Age: 24
DRG: 560 | End: 2019-12-04
Payer: MEDICARE

## 2019-12-04 PROBLEM — Z30.430 ENCOUNTER FOR INSERTION OF MIRENA IUD: Status: ACTIVE | Noted: 2019-12-04

## 2019-12-04 PROBLEM — B25.9 CMV (CYTOMEGALOVIRUS) (HCC): Status: ACTIVE | Noted: 2019-12-04

## 2019-12-04 LAB
CULTURE: NO GROWTH
CYTOMEGALOVIRUS IGG ANTIBODY: 21.7
Lab: NORMAL
SEND OUT REPORT: NORMAL
SPECIMEN DESCRIPTION: NORMAL
TEST NAME: NORMAL

## 2019-12-04 PROCEDURE — 2500000003 HC RX 250 WO HCPCS: Performed by: NURSE ANESTHETIST, CERTIFIED REGISTERED

## 2019-12-04 PROCEDURE — 96365 THER/PROPH/DIAG IV INF INIT: CPT

## 2019-12-04 PROCEDURE — 6360000002 HC RX W HCPCS: Performed by: ANESTHESIOLOGY

## 2019-12-04 PROCEDURE — 6360000002 HC RX W HCPCS: Performed by: STUDENT IN AN ORGANIZED HEALTH CARE EDUCATION/TRAINING PROGRAM

## 2019-12-04 PROCEDURE — 0UH97HZ INSERTION OF CONTRACEPTIVE DEVICE INTO UTERUS, VIA NATURAL OR ARTIFICIAL OPENING: ICD-10-PCS | Performed by: STUDENT IN AN ORGANIZED HEALTH CARE EDUCATION/TRAINING PROGRAM

## 2019-12-04 PROCEDURE — 1220000000 HC SEMI PRIVATE OB R&B

## 2019-12-04 PROCEDURE — 3700000025 EPIDURAL BLOCK: Performed by: ANESTHESIOLOGY

## 2019-12-04 PROCEDURE — 6360000002 HC RX W HCPCS: Performed by: NURSE ANESTHETIST, CERTIFIED REGISTERED

## 2019-12-04 PROCEDURE — 88307 TISSUE EXAM BY PATHOLOGIST: CPT

## 2019-12-04 PROCEDURE — 7200000001 HC VAGINAL DELIVERY

## 2019-12-04 PROCEDURE — 2580000003 HC RX 258: Performed by: STUDENT IN AN ORGANIZED HEALTH CARE EDUCATION/TRAINING PROGRAM

## 2019-12-04 PROCEDURE — 6370000000 HC RX 637 (ALT 250 FOR IP): Performed by: STUDENT IN AN ORGANIZED HEALTH CARE EDUCATION/TRAINING PROGRAM

## 2019-12-04 PROCEDURE — 96366 THER/PROPH/DIAG IV INF ADDON: CPT

## 2019-12-04 RX ORDER — IBUPROFEN 800 MG/1
800 TABLET ORAL EVERY 8 HOURS PRN
Status: DISCONTINUED | OUTPATIENT
Start: 2019-12-04 | End: 2019-12-04

## 2019-12-04 RX ORDER — ROPIVACAINE HYDROCHLORIDE 2 MG/ML
INJECTION, SOLUTION EPIDURAL; INFILTRATION; PERINEURAL
Status: COMPLETED
Start: 2019-12-04 | End: 2019-12-04

## 2019-12-04 RX ORDER — NALOXONE HYDROCHLORIDE 0.4 MG/ML
0.4 INJECTION, SOLUTION INTRAMUSCULAR; INTRAVENOUS; SUBCUTANEOUS PRN
Status: DISCONTINUED | OUTPATIENT
Start: 2019-12-04 | End: 2019-12-04

## 2019-12-04 RX ORDER — SIMETHICONE 80 MG
80 TABLET,CHEWABLE ORAL EVERY 6 HOURS PRN
Status: DISCONTINUED | OUTPATIENT
Start: 2019-12-04 | End: 2019-12-06 | Stop reason: HOSPADM

## 2019-12-04 RX ORDER — NALBUPHINE HCL 10 MG/ML
10 AMPUL (ML) INJECTION ONCE
Status: COMPLETED | OUTPATIENT
Start: 2019-12-04 | End: 2019-12-04

## 2019-12-04 RX ORDER — DOCUSATE SODIUM 100 MG/1
100 CAPSULE, LIQUID FILLED ORAL 2 TIMES DAILY
Qty: 60 CAPSULE | Refills: 1 | Status: SHIPPED | OUTPATIENT
Start: 2019-12-04 | End: 2020-01-03

## 2019-12-04 RX ORDER — ACETAMINOPHEN 160 MG/5ML
1000 SOLUTION ORAL EVERY 6 HOURS PRN
Status: DISCONTINUED | OUTPATIENT
Start: 2019-12-04 | End: 2019-12-05

## 2019-12-04 RX ORDER — DOCUSATE SODIUM 100 MG/1
100 CAPSULE, LIQUID FILLED ORAL 2 TIMES DAILY
Status: DISCONTINUED | OUTPATIENT
Start: 2019-12-04 | End: 2019-12-06 | Stop reason: HOSPADM

## 2019-12-04 RX ORDER — ROPIVACAINE HYDROCHLORIDE 2 MG/ML
INJECTION, SOLUTION EPIDURAL; INFILTRATION; PERINEURAL PRN
Status: DISCONTINUED | OUTPATIENT
Start: 2019-12-04 | End: 2019-12-04 | Stop reason: SDUPTHER

## 2019-12-04 RX ORDER — LIDOCAINE HYDROCHLORIDE AND EPINEPHRINE 15; 5 MG/ML; UG/ML
INJECTION, SOLUTION EPIDURAL PRN
Status: DISCONTINUED | OUTPATIENT
Start: 2019-12-04 | End: 2019-12-04 | Stop reason: SDUPTHER

## 2019-12-04 RX ORDER — NALBUPHINE HCL 10 MG/ML
AMPUL (ML) INJECTION
Status: DISCONTINUED
Start: 2019-12-04 | End: 2019-12-04

## 2019-12-04 RX ORDER — NALBUPHINE HCL 10 MG/ML
5 AMPUL (ML) INJECTION EVERY 4 HOURS PRN
Status: DISCONTINUED | OUTPATIENT
Start: 2019-12-04 | End: 2019-12-04

## 2019-12-04 RX ORDER — LANOLIN 100 %
OINTMENT (GRAM) TOPICAL PRN
Status: DISCONTINUED | OUTPATIENT
Start: 2019-12-04 | End: 2019-12-06 | Stop reason: HOSPADM

## 2019-12-04 RX ORDER — ONDANSETRON 2 MG/ML
4 INJECTION INTRAMUSCULAR; INTRAVENOUS EVERY 6 HOURS PRN
Status: DISCONTINUED | OUTPATIENT
Start: 2019-12-04 | End: 2019-12-04

## 2019-12-04 RX ORDER — FERROUS SULFATE 325(65) MG
325 TABLET ORAL 2 TIMES DAILY
Qty: 180 TABLET | Refills: 1 | Status: SHIPPED | OUTPATIENT
Start: 2019-12-04 | End: 2020-07-18

## 2019-12-04 RX ORDER — LIDOCAINE HYDROCHLORIDE AND EPINEPHRINE 15; 5 MG/ML; UG/ML
INJECTION, SOLUTION EPIDURAL
Status: COMPLETED
Start: 2019-12-04 | End: 2019-12-04

## 2019-12-04 RX ORDER — ONDANSETRON 4 MG/1
4 TABLET, FILM COATED ORAL EVERY 4 HOURS PRN
Status: DISCONTINUED | OUTPATIENT
Start: 2019-12-04 | End: 2019-12-06 | Stop reason: HOSPADM

## 2019-12-04 RX ORDER — ACETAMINOPHEN 500 MG
1000 TABLET ORAL EVERY 6 HOURS PRN
Status: DISCONTINUED | OUTPATIENT
Start: 2019-12-04 | End: 2019-12-04

## 2019-12-04 RX ADMIN — ROPIVACAINE HYDROCHLORIDE 6 ML: 2 INJECTION, SOLUTION EPIDURAL; INFILTRATION at 00:53

## 2019-12-04 RX ADMIN — ACETAMINOPHEN 1000 MG: 650 SOLUTION ORAL at 17:54

## 2019-12-04 RX ADMIN — ACETAMINOPHEN 1000 MG: 650 SOLUTION ORAL at 08:35

## 2019-12-04 RX ADMIN — Medication 10 MG: at 00:15

## 2019-12-04 RX ADMIN — IBUPROFEN 800 MG: 200 SUSPENSION ORAL at 15:15

## 2019-12-04 RX ADMIN — IBUPROFEN 800 MG: 200 SUSPENSION ORAL at 21:53

## 2019-12-04 RX ADMIN — LEVONORGESTREL 1 EACH: 52 INTRAUTERINE DEVICE INTRAUTERINE at 02:00

## 2019-12-04 RX ADMIN — LIDOCAINE HYDROCHLORIDE,EPINEPHRINE BITARTRATE 3 ML: 15; .005 INJECTION, SOLUTION EPIDURAL; INFILTRATION; INTRACAUDAL; PERINEURAL at 00:43

## 2019-12-04 RX ADMIN — SODIUM CHLORIDE, POTASSIUM CHLORIDE, SODIUM LACTATE AND CALCIUM CHLORIDE: 600; 310; 30; 20 INJECTION, SOLUTION INTRAVENOUS at 00:10

## 2019-12-04 RX ADMIN — Medication 10 ML/HR: at 00:56

## 2019-12-04 ASSESSMENT — PAIN SCALES - GENERAL
PAINLEVEL_OUTOF10: 5
PAINLEVEL_OUTOF10: 5
PAINLEVEL_OUTOF10: 4
PAINLEVEL_OUTOF10: 6
PAINLEVEL_OUTOF10: 9

## 2019-12-05 LAB — CMV IGM: 0.5

## 2019-12-05 PROCEDURE — 1220000000 HC SEMI PRIVATE OB R&B

## 2019-12-05 PROCEDURE — 6370000000 HC RX 637 (ALT 250 FOR IP): Performed by: STUDENT IN AN ORGANIZED HEALTH CARE EDUCATION/TRAINING PROGRAM

## 2019-12-05 RX ORDER — ACETAMINOPHEN 160 MG/5ML
650 SOLUTION ORAL EVERY 4 HOURS PRN
Status: DISCONTINUED | OUTPATIENT
Start: 2019-12-05 | End: 2019-12-06 | Stop reason: HOSPADM

## 2019-12-05 RX ADMIN — IBUPROFEN 400 MG: 200 SUSPENSION ORAL at 23:56

## 2019-12-05 RX ADMIN — IBUPROFEN 800 MG: 200 SUSPENSION ORAL at 07:02

## 2019-12-05 RX ADMIN — DOCUSATE SODIUM 100 MG: 100 CAPSULE, LIQUID FILLED ORAL at 23:56

## 2019-12-05 RX ADMIN — IBUPROFEN 400 MG: 200 SUSPENSION ORAL at 13:28

## 2019-12-05 RX ADMIN — ACETAMINOPHEN 1000 MG: 650 SOLUTION ORAL at 00:01

## 2019-12-05 RX ADMIN — ACETAMINOPHEN 1000 MG: 650 SOLUTION ORAL at 17:19

## 2019-12-05 RX ADMIN — ACETAMINOPHEN 1000 MG: 650 SOLUTION ORAL at 09:31

## 2019-12-05 RX ADMIN — IBUPROFEN 400 MG: 200 SUSPENSION ORAL at 18:45

## 2019-12-05 RX ADMIN — ACETAMINOPHEN 650 MG: 650 SOLUTION ORAL at 23:57

## 2019-12-05 ASSESSMENT — PAIN SCALES - GENERAL
PAINLEVEL_OUTOF10: 3
PAINLEVEL_OUTOF10: 5
PAINLEVEL_OUTOF10: 4
PAINLEVEL_OUTOF10: 6
PAINLEVEL_OUTOF10: 2

## 2019-12-05 ASSESSMENT — PAIN DESCRIPTION - DESCRIPTORS: DESCRIPTORS: ACHING;CRAMPING

## 2019-12-05 ASSESSMENT — PAIN DESCRIPTION - LOCATION: LOCATION: ABDOMEN

## 2019-12-06 VITALS
TEMPERATURE: 98.6 F | HEIGHT: 67 IN | DIASTOLIC BLOOD PRESSURE: 68 MMHG | BODY MASS INDEX: 25.43 KG/M2 | OXYGEN SATURATION: 100 % | SYSTOLIC BLOOD PRESSURE: 116 MMHG | WEIGHT: 162 LBS | HEART RATE: 85 BPM | RESPIRATION RATE: 17 BRPM

## 2019-12-06 LAB
PARVOVIRUS B19 IGG ANTIBODY: 3.55 IV
PARVOVIRUS B19 IGM ANTIBODY: 0.25 IV

## 2019-12-06 PROCEDURE — 99233 SBSQ HOSP IP/OBS HIGH 50: CPT | Performed by: OBSTETRICS & GYNECOLOGY

## 2019-12-06 PROCEDURE — 6370000000 HC RX 637 (ALT 250 FOR IP): Performed by: STUDENT IN AN ORGANIZED HEALTH CARE EDUCATION/TRAINING PROGRAM

## 2019-12-06 RX ADMIN — IBUPROFEN 400 MG: 200 SUSPENSION ORAL at 12:50

## 2019-12-06 RX ADMIN — ACETAMINOPHEN 650 MG: 650 SOLUTION ORAL at 03:51

## 2019-12-06 RX ADMIN — IBUPROFEN 400 MG: 200 SUSPENSION ORAL at 06:58

## 2019-12-06 RX ADMIN — ACETAMINOPHEN 650 MG: 650 SOLUTION ORAL at 16:13

## 2019-12-06 ASSESSMENT — PAIN SCALES - GENERAL
PAINLEVEL_OUTOF10: 6
PAINLEVEL_OUTOF10: 3
PAINLEVEL_OUTOF10: 5
PAINLEVEL_OUTOF10: 5

## 2019-12-09 LAB — SURGICAL PATHOLOGY REPORT: NORMAL

## 2019-12-14 LAB
COXSACKIE B1 ANTIBODY: NORMAL
COXSACKIE B2 ANTIBODY: NORMAL
COXSACKIE B3 ANTIBODY: NORMAL
COXSACKIE B4 ANTIBODY: NORMAL
COXSACKIE B5 ANTIBODY: NORMAL
COXSACKIE B6 ANTIBODY: NORMAL

## 2020-01-01 NOTE — PROGRESS NOTES
Optimal growth and development Continue ad miguel feedings, follow up with pediatrician in 1-2 days of discharge. tetrahydrocannabinol [facility]) abuse 2018     Priority: Medium     Last used 2017. Pt counseled not recommended in pregnancy. Maternal/Fetal risks reviewed. Pt verbalizes understanding.  Fetal exposure to alcohol 2018     Priority: Medium     Pt drank excessively on in the early first trimester. Last drank on 9/10/17      No prenatal care in current pregnancy in second trimester      Priority: Medium    Non-intractable vomiting with nausea      Priority: Medium    FamHx congenital heart disease 10/14/2014     Priority: Medium     Patient's mother and maternal aunt were born with \"hole in heart. \"  She will need Fetal Echo with MFM or Peds Cardio                Plan:   MFM appointment for 2/15/18 - will need fetal echo due to family history of congenital heart disease. Initial labs drawn and 28 weeks labs found to be wnl. She will get Influenza vaccine today. She desires Tdap injection and that will be due with next visit. Prenatal vitamins. Problem list reviewed and updated. NT Screen/Quad Testing and MSAFP Single Marker: declined - patient is outside of the first trimester  Role of ultrasound in pregnancy discussed; fetal survey: requests  Amniocentesis discussed: Undecided  Cultures & Pap smear Collected  Follow-up in 4 weeks  Repeat Annual every 1 year  Cervical Cytology Evaluation begins at 24years old. If Negative Cytology, Follow-up screening per current guidelines. COUNSELING COMPLETED:  INITIAL OBSTETRICAL VISIT EVALUATION:  The patient was seen full history and physical was completed/reviewed. Cytology was collected for patients over 24years of age. Cultures were collected. The patient was counseled on office policies and she was counseled on termination of pregnancy in the state of [place]. The patient was counseled on Toxoplasmosis, HIV, Tobacco Abuse, Group Beta Strep Infections, Cystic Fibrosis,  Labor precautions and Sickle Cell disease.     The patient was counseled on the risks of tobacco abuse. Both maternal and fetal. She was instructed to stop smoking if currently using tobacco. Morbidity, mortality, and cessation programs were reviewed. The risks include but are not limited to increased risks of  labor,  delivery, premature rupture of membranes, intrauterine growth restriction, intrauterine fetal demise and abruptio placenta. Secondary smoke risks were also reviewed. Increases in cancer, respiratory problems, and sudden infant death syndrome were reviewed as well. The patient was informed of a 2-4% risk of congenital anomalies in the general population. She was also informed that karyotyping is the only way to evaluate the fetus for genetic problems and genetic lethal anomalies. Chorionic villous sampling, amniocentesis and NIPT testing were also discussed with morbidity rates in detail. She undecided the procedure. Route of delivery and counseling on vaginal, operative vaginal, and  sections were completed with the risks of each to both the patient as well as her baby. The possibility of a blood transfusion was discussed as well. The patient was not opposed to receiving a transfusion if needed. Nuchal translucency/Quad Evaluation and MSAFP single marker testing was reviewed in detail with attention to timing of testing and their windows. For patients beyond the gestational age for Nuchal translucency evaluation Quad testing was recommended. Timing for the Quad test was reviewed. Benefits of the above testing was reviewed. A second trimester amniocentesis was also made available to the patient. Risks, Benefits and non-invasive alternative testing was reviewed.      The literature regarding a questionable link to pitocin augmentation and induction of labor, the assistance of labor contractions and the initiation of contractions to help delivery, have been reviewed with the patient regarding the increased potential of having a  Continue ad miguel feedings, follow up with pediatrician in 1-2 days of discharge. Always place on back to sleep. Other follow up appointments as listed below. Continue ad miguel feedings with EHM/Neosure every 3 hours. Follow up with pediatrician in 1-2 days after discharge. Always place on back to sleep. Other follow up appointments as listed below. Continue ad miguel feedings with EHM 20 calories/Neosure 22 calories every 3 hours. Follow up with pediatrician in 1-2 days after discharge. Always place on back to sleep. Other follow up appointments as listed below.

## 2020-04-29 ENCOUNTER — HOSPITAL ENCOUNTER (EMERGENCY)
Age: 25
Discharge: HOME | End: 2020-04-29
Attending: EMERGENCY MEDICINE
Payer: MEDICARE

## 2020-04-29 ENCOUNTER — NURSE TRIAGE (OUTPATIENT)
Dept: OTHER | Facility: CLINIC | Age: 25
End: 2020-04-29

## 2020-04-29 VITALS
HEART RATE: 92 BPM | OXYGEN SATURATION: 99 % | TEMPERATURE: 98 F | DIASTOLIC BLOOD PRESSURE: 80 MMHG | RESPIRATION RATE: 14 BRPM | SYSTOLIC BLOOD PRESSURE: 119 MMHG

## 2020-04-29 PROCEDURE — 99283 EMERGENCY DEPT VISIT LOW MDM: CPT

## 2020-04-29 ASSESSMENT — ENCOUNTER SYMPTOMS
ABDOMINAL PAIN: 0
SORE THROAT: 0
COUGH: 1
VOMITING: 0
DIARRHEA: 0
NAUSEA: 0
ABDOMINAL DISTENTION: 0
SHORTNESS OF BREATH: 1
RHINORRHEA: 0
WHEEZING: 0
CONSTIPATION: 0

## 2020-04-29 NOTE — ED PROVIDER NOTES
REFERRED TO:  [facility]  [address] [facility] [address]  [phone]    If symptoms worsen      DISCHARGE MEDICATIONS:  New Prescriptions    No medications on file       Martina Russo  9:59 AM    Attending Emergency Physician  [address] [address] [facility]    (Please note that portions of this note were completed with a voice recognition program.  Geovanna Bear made to edit the dictations but occasionally words are mis-transcribed.)              Tiesha Calvo,   04/29/20 3579

## 2020-04-30 ENCOUNTER — CARE COORDINATION (OUTPATIENT)
Dept: CARE COORDINATION | Age: 25
End: 2020-04-30

## 2020-04-30 NOTE — CARE COORDINATION
Norma Hussein was seen at [facility] ER 2020- Viral Infection. Exposure to COVID. Sister tested positive. Left message requesting return call re:Covid Initial/ER F/U call. Norma Hussein returned call. She stated \"I feel fine\" Denied any symptoms. Discussed isolation- she stated she is following with isolation. Covid reviewed. Discussed [facility] and during conversation- phone was disconnected. Attempted to call back- no answer. Patient contacted regarding COVID-19 exposure. Care Transition Nurse/ Ambulatory Care Manager contacted the patient by telephone to perform post discharge assessment. Verified name and  with patient as identifiers. Provided introduction to self, and explanation of the CTN/ACM role, and reason for call due to risk factors for infection and/or exposure to COVID-19. Symptoms reviewed with patient who verbalized the following symptoms: no worsening symptoms. Due to no new or worsening symptoms encounter was not routed to provider for escalation. Patient has following risk factors of: no known risk factors. CTN/ACM reviewed discharge instructions, medical action plan and red flags such as increased shortness of breath, increasing fever and signs of decompensation with patient who verbalized understanding. Discussed exposure protocols and quarantine with CDC Guidelines What to do if you are sick with coronavirus disease .  Patient was given an opportunity for questions and concerns. The patient agrees to contact the [facility] exposure line [phone], local [facility] department [place] Department of Health: ([phone]) and PCP office for questions related to their healthcare. CTN/ACM provided contact information for future needs.     Reviewed and educated patient on any new and changed medications related to discharge diagnosis     Patient/family/caregiver given information for [facility] and agrees to enroll no  Patient's preferred e-mail: N/A   Patient's preferred phone

## 2020-05-07 ENCOUNTER — CARE COORDINATION (OUTPATIENT)
Dept: CARE COORDINATION | Age: 25
End: 2020-05-07

## 2020-05-14 ENCOUNTER — CARE COORDINATION (OUTPATIENT)
Dept: CARE COORDINATION | Age: 25
End: 2020-05-14

## 2020-05-14 NOTE — CARE COORDINATION
Spoke with Agustin. She stil has \"a littke cough\" otherwise feels fine. Your Patient resolved from the Care Transitions episode on 5/14/2020  Patient/family has been provided the following resources and education related to COVID-19:                         Signs, symptoms and red flags related to COVID-19            CDC exposure and quarantine guidelines            [facility] exposure contact - [phone]            Contact for their local Department of Health                 Patient currently reports that the following symptoms have improved:  cough and no new/worsening symptoms     No further outreach scheduled with this CTN/ACM. Episode of Care resolved. Patient has this CTN/ACM contact information if future needs arise.

## 2020-07-18 ENCOUNTER — HOSPITAL ENCOUNTER (EMERGENCY)
Age: 25
Discharge: HOME | End: 2020-07-18
Attending: EMERGENCY MEDICINE
Payer: MEDICARE

## 2020-07-18 VITALS
RESPIRATION RATE: 16 BRPM | SYSTOLIC BLOOD PRESSURE: 123 MMHG | BODY MASS INDEX: 26.63 KG/M2 | OXYGEN SATURATION: 98 % | HEART RATE: 89 BPM | WEIGHT: 170 LBS | DIASTOLIC BLOOD PRESSURE: 70 MMHG | TEMPERATURE: 97.3 F

## 2020-07-18 LAB
-: NORMAL
AMORPHOUS: NORMAL
BACTERIA: NORMAL
BILIRUBIN URINE: NEGATIVE
CASTS UA: NORMAL /LPF (ref 0–8)
COLOR: YELLOW
COMMENT UA: ABNORMAL
CRYSTALS, UA: NORMAL /HPF
DIRECT EXAM: ABNORMAL
EPITHELIAL CELLS UA: NORMAL /HPF (ref 0–5)
GLUCOSE URINE: NEGATIVE
HCG(URINE) PREGNANCY TEST: NEGATIVE
KETONES, URINE: NEGATIVE
LEUKOCYTE ESTERASE, URINE: ABNORMAL
Lab: ABNORMAL
MUCUS: NORMAL
NITRITE, URINE: NEGATIVE
OTHER OBSERVATIONS UA: NORMAL
PH UA: 6.5 (ref 5–8)
PROTEIN UA: NEGATIVE
RBC UA: NORMAL /HPF (ref 0–4)
RENAL EPITHELIAL, UA: NORMAL /HPF
SPECIFIC GRAVITY UA: 1.02 (ref 1–1.03)
SPECIMEN DESCRIPTION: ABNORMAL
TRICHOMONAS: NORMAL
TURBIDITY: CLEAR
URINE HGB: NEGATIVE
UROBILINOGEN, URINE: NORMAL
WBC UA: NORMAL /HPF (ref 0–5)
YEAST: NORMAL

## 2020-07-18 PROCEDURE — 87591 N.GONORRHOEAE DNA AMP PROB: CPT

## 2020-07-18 PROCEDURE — 6370000000 HC RX 637 (ALT 250 FOR IP): Performed by: STUDENT IN AN ORGANIZED HEALTH CARE EDUCATION/TRAINING PROGRAM

## 2020-07-18 PROCEDURE — 96372 THER/PROPH/DIAG INJ SC/IM: CPT

## 2020-07-18 PROCEDURE — 87510 GARDNER VAG DNA DIR PROBE: CPT

## 2020-07-18 PROCEDURE — 99283 EMERGENCY DEPT VISIT LOW MDM: CPT

## 2020-07-18 PROCEDURE — 6360000002 HC RX W HCPCS: Performed by: STUDENT IN AN ORGANIZED HEALTH CARE EDUCATION/TRAINING PROGRAM

## 2020-07-18 PROCEDURE — 87660 TRICHOMONAS VAGIN DIR PROBE: CPT

## 2020-07-18 PROCEDURE — 81001 URINALYSIS AUTO W/SCOPE: CPT

## 2020-07-18 PROCEDURE — 87480 CANDIDA DNA DIR PROBE: CPT

## 2020-07-18 PROCEDURE — 87491 CHLMYD TRACH DNA AMP PROBE: CPT

## 2020-07-18 PROCEDURE — 81025 URINE PREGNANCY TEST: CPT

## 2020-07-18 RX ORDER — CEPHALEXIN 500 MG/1
500 CAPSULE ORAL 2 TIMES DAILY
Qty: 21 CAPSULE | Refills: 0 | Status: SHIPPED | OUTPATIENT
Start: 2020-07-18 | End: 2020-07-18 | Stop reason: SDUPTHER

## 2020-07-18 RX ORDER — CEPHALEXIN 500 MG/1
500 CAPSULE ORAL 2 TIMES DAILY
Qty: 14 CAPSULE | Refills: 0 | Status: SHIPPED | OUTPATIENT
Start: 2020-07-18 | End: 2020-07-25

## 2020-07-18 RX ORDER — METRONIDAZOLE 500 MG/1
500 TABLET ORAL 2 TIMES DAILY
Qty: 14 TABLET | Refills: 0 | Status: SHIPPED | OUTPATIENT
Start: 2020-07-18

## 2020-07-18 RX ORDER — CEPHALEXIN 250 MG/1
500 CAPSULE ORAL ONCE
Status: COMPLETED | OUTPATIENT
Start: 2020-07-18 | End: 2020-07-18

## 2020-07-18 RX ORDER — AZITHROMYCIN 250 MG/1
1000 TABLET, FILM COATED ORAL ONCE
Status: COMPLETED | OUTPATIENT
Start: 2020-07-18 | End: 2020-07-18

## 2020-07-18 RX ORDER — CEFTRIAXONE SODIUM 250 MG/1
250 INJECTION, POWDER, FOR SOLUTION INTRAMUSCULAR; INTRAVENOUS ONCE
Status: COMPLETED | OUTPATIENT
Start: 2020-07-18 | End: 2020-07-18

## 2020-07-18 RX ADMIN — CEFTRIAXONE SODIUM 250 MG: 250 INJECTION, POWDER, FOR SOLUTION INTRAMUSCULAR; INTRAVENOUS at 18:46

## 2020-07-18 RX ADMIN — CEPHALEXIN 500 MG: 250 CAPSULE ORAL at 18:46

## 2020-07-18 RX ADMIN — AZITHROMYCIN 1000 MG: 250 TABLET, FILM COATED ORAL at 18:46

## 2020-07-18 ASSESSMENT — ENCOUNTER SYMPTOMS
NAUSEA: 0
ABDOMINAL PAIN: 0
DIARRHEA: 0
BLOOD IN STOOL: 0
TROUBLE SWALLOWING: 0
SHORTNESS OF BREATH: 0
PHOTOPHOBIA: 0
CONSTIPATION: 0
CHEST TIGHTNESS: 0
VOMITING: 0
COUGH: 0

## 2020-07-18 NOTE — ED PROVIDER NOTES
[address] [facility]     Emergency Department     Faculty Note/ Attestation      Pt Name: Vanessa Christina                                       MRN: [id number]  Bret 1995  Date of evaluation: 7/18/2020    Patients PCP:    . None (Inactive)    Attestation  I performed a history and physical examination of the patient and discussed management with the resident. I reviewed the residents note and agree with the documented findings and plan of care. Any areas of disagreement are noted on the chart. I was personally present for the key portions of any procedures. I have documented in the chart those procedures where I was not present during the key portions. I have reviewed the emergency nurses triage note. I agree with the chief complaint, past medical history, past surgical history, allergies, medications, social and family history as documented unless otherwise noted below. For Physician Assistant/ Nurse Practitioner cases/documentation I have personally evaluated this patient and have completed at least one if not all key elements of the E/M (history, physical exam, and MDM). Additional findings are as noted. Initial Screens:             Vitals:    Vitals:    07/18/20 1706   BP: 123/70   Pulse: 89   Resp: 16   Temp: 97.3 °F (36.3 °C)   TempSrc: Oral   SpO2: 98%   Weight: 170 lb (77.1 kg)       CHIEF COMPLAINT       Chief Complaint   Patient presents with    Exposure to STD     pt denies complaints, states she was told by her partner she was exposed to STD and needs checked. The pt is a 21 YO F who is concerned with STI. The pt noting no fevers chills or pain. Partner had discharge.       DIAGNOSTIC RESULTS     RADIOLOGY:   No orders to display       LABS:  Labs Reviewed   VAGINITIS DNA PROBE   C.TRACHOMATIS N.GONORRHOEAE DNA   URINALYSIS   PREGNANCY, URINE       EMERGENCY DEPARTMENT COURSE:     -------------------------  BP: 123/70, Temp: 97.3 °F (36.3 °C), Pulse: 89, Resp:
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
## 2020-07-18 NOTE — ED NOTES
Pelvic swabs sent to lab by writer. Pt to restroom with steady gait.      Rochelle Garcia RN  07/18/20 5942

## 2020-07-21 LAB
C TRACH DNA GENITAL QL NAA+PROBE: NEGATIVE
N. GONORRHOEAE DNA: NEGATIVE
SPECIMEN DESCRIPTION: NORMAL

## 2020-07-22 ENCOUNTER — HOSPITAL ENCOUNTER (EMERGENCY)
Age: 25
Discharge: HOME | End: 2020-07-22
Attending: EMERGENCY MEDICINE
Payer: MEDICARE

## 2020-07-22 VITALS
SYSTOLIC BLOOD PRESSURE: 109 MMHG | HEART RATE: 84 BPM | DIASTOLIC BLOOD PRESSURE: 76 MMHG | TEMPERATURE: 98.4 F | RESPIRATION RATE: 18 BRPM | OXYGEN SATURATION: 100 %

## 2020-07-22 LAB
ABSOLUTE EOS #: 0.04 K/UL (ref 0–0.44)
ABSOLUTE IMMATURE GRANULOCYTE: 0.03 K/UL (ref 0–0.3)
ABSOLUTE LYMPH #: 2.32 K/UL (ref 1.1–3.7)
ABSOLUTE MONO #: 0.88 K/UL (ref 0.1–1.2)
ALBUMIN SERPL-MCNC: 4.3 G/DL (ref 3.5–5.2)
ALBUMIN/GLOBULIN RATIO: 1.4 (ref 1–2.5)
ALP BLD-CCNC: 50 U/L (ref 35–104)
ALT SERPL-CCNC: 13 U/L (ref 5–33)
ANION GAP SERPL CALCULATED.3IONS-SCNC: 13 MMOL/L (ref 9–17)
AST SERPL-CCNC: 28 U/L
BASOPHILS # BLD: 0 % (ref 0–2)
BASOPHILS ABSOLUTE: <0.03 K/UL (ref 0–0.2)
BILIRUB SERPL-MCNC: 0.38 MG/DL (ref 0.3–1.2)
BUN BLDV-MCNC: 11 MG/DL (ref 6–20)
BUN/CREAT BLD: ABNORMAL (ref 9–20)
CALCIUM SERPL-MCNC: 8.8 MG/DL (ref 8.6–10.4)
CHLORIDE BLD-SCNC: 103 MMOL/L (ref 98–107)
CO2: 24 MMOL/L (ref 20–31)
CREAT SERPL-MCNC: 0.67 MG/DL (ref 0.5–0.9)
DIFFERENTIAL TYPE: ABNORMAL
EOSINOPHILS RELATIVE PERCENT: 0 % (ref 1–4)
GFR AFRICAN AMERICAN: >60 ML/MIN
GFR NON-AFRICAN AMERICAN: >60 ML/MIN
GFR SERPL CREATININE-BSD FRML MDRD: ABNORMAL ML/MIN/{1.73_M2}
GFR SERPL CREATININE-BSD FRML MDRD: ABNORMAL ML/MIN/{1.73_M2}
GLUCOSE BLD-MCNC: 154 MG/DL (ref 70–99)
HCG QUALITATIVE: NEGATIVE
HCT VFR BLD CALC: 38.8 % (ref 36.3–47.1)
HEMOGLOBIN: 12.6 G/DL (ref 11.9–15.1)
IMMATURE GRANULOCYTES: 0 %
LIPASE: 19 U/L (ref 13–60)
LYMPHOCYTES # BLD: 20 % (ref 24–43)
MAGNESIUM: 1.6 MG/DL (ref 1.6–2.6)
MCH RBC QN AUTO: 29.8 PG (ref 25.2–33.5)
MCHC RBC AUTO-ENTMCNC: 32.5 G/DL (ref 28.4–34.8)
MCV RBC AUTO: 91.7 FL (ref 82.6–102.9)
MONOCYTES # BLD: 8 % (ref 3–12)
NRBC AUTOMATED: 0 PER 100 WBC
PDW BLD-RTO: 15.4 % (ref 11.8–14.4)
PLATELET # BLD: 220 K/UL (ref 138–453)
PLATELET ESTIMATE: ABNORMAL
PMV BLD AUTO: 10.4 FL (ref 8.1–13.5)
POTASSIUM SERPL-SCNC: 3.1 MMOL/L (ref 3.7–5.3)
RBC # BLD: 4.23 M/UL (ref 3.95–5.11)
RBC # BLD: ABNORMAL 10*6/UL
SEG NEUTROPHILS: 72 % (ref 36–65)
SEGMENTED NEUTROPHILS ABSOLUTE COUNT: 8.47 K/UL (ref 1.5–8.1)
SODIUM BLD-SCNC: 140 MMOL/L (ref 135–144)
TOTAL PROTEIN: 7.3 G/DL (ref 6.4–8.3)
WBC # BLD: 11.8 K/UL (ref 3.5–11.3)
WBC # BLD: ABNORMAL 10*3/UL

## 2020-07-22 PROCEDURE — 85025 COMPLETE CBC W/AUTO DIFF WBC: CPT

## 2020-07-22 PROCEDURE — 6360000002 HC RX W HCPCS: Performed by: EMERGENCY MEDICINE

## 2020-07-22 PROCEDURE — 96375 TX/PRO/DX INJ NEW DRUG ADDON: CPT

## 2020-07-22 PROCEDURE — 83690 ASSAY OF LIPASE: CPT

## 2020-07-22 PROCEDURE — 80053 COMPREHEN METABOLIC PANEL: CPT

## 2020-07-22 PROCEDURE — 2580000003 HC RX 258: Performed by: EMERGENCY MEDICINE

## 2020-07-22 PROCEDURE — 83735 ASSAY OF MAGNESIUM: CPT

## 2020-07-22 PROCEDURE — C9113 INJ PANTOPRAZOLE SODIUM, VIA: HCPCS | Performed by: EMERGENCY MEDICINE

## 2020-07-22 PROCEDURE — 84703 CHORIONIC GONADOTROPIN ASSAY: CPT

## 2020-07-22 PROCEDURE — 99284 EMERGENCY DEPT VISIT MOD MDM: CPT

## 2020-07-22 PROCEDURE — 96374 THER/PROPH/DIAG INJ IV PUSH: CPT

## 2020-07-22 RX ORDER — DIPHENHYDRAMINE HYDROCHLORIDE 50 MG/ML
12.5 INJECTION INTRAMUSCULAR; INTRAVENOUS ONCE
Status: COMPLETED | OUTPATIENT
Start: 2020-07-22 | End: 2020-07-22

## 2020-07-22 RX ORDER — 0.9 % SODIUM CHLORIDE 0.9 %
1000 INTRAVENOUS SOLUTION INTRAVENOUS ONCE
Status: COMPLETED | OUTPATIENT
Start: 2020-07-22 | End: 2020-07-22

## 2020-07-22 RX ORDER — PANTOPRAZOLE SODIUM 40 MG/10ML
40 INJECTION, POWDER, LYOPHILIZED, FOR SOLUTION INTRAVENOUS ONCE
Status: COMPLETED | OUTPATIENT
Start: 2020-07-22 | End: 2020-07-22

## 2020-07-22 RX ADMIN — DIPHENHYDRAMINE HYDROCHLORIDE 12.5 MG: 50 INJECTION, SOLUTION INTRAMUSCULAR; INTRAVENOUS at 07:04

## 2020-07-22 RX ADMIN — PANTOPRAZOLE SODIUM 40 MG: 40 INJECTION, POWDER, FOR SOLUTION INTRAVENOUS at 07:04

## 2020-07-22 RX ADMIN — SODIUM CHLORIDE 1000 ML: 9 INJECTION, SOLUTION INTRAVENOUS at 07:09

## 2020-07-22 ASSESSMENT — PAIN SCALES - GENERAL: PAINLEVEL_OUTOF10: 6

## 2020-07-22 ASSESSMENT — PAIN DESCRIPTION - LOCATION: LOCATION: ABDOMEN

## 2020-07-22 ASSESSMENT — PAIN DESCRIPTION - ONSET: ONSET: AWAKENED FROM SLEEP

## 2020-07-22 ASSESSMENT — ENCOUNTER SYMPTOMS
BACK PAIN: 0
SHORTNESS OF BREATH: 0
DIARRHEA: 0
TROUBLE SWALLOWING: 0
ABDOMINAL PAIN: 1
VOMITING: 1

## 2020-07-22 ASSESSMENT — PAIN DESCRIPTION - ORIENTATION: ORIENTATION: ANTERIOR

## 2020-07-22 ASSESSMENT — PAIN DESCRIPTION - FREQUENCY: FREQUENCY: INTERMITTENT

## 2020-07-22 ASSESSMENT — PAIN DESCRIPTION - PAIN TYPE: TYPE: ACUTE PAIN

## 2020-07-22 ASSESSMENT — PAIN DESCRIPTION - PROGRESSION: CLINICAL_PROGRESSION: NOT CHANGED

## 2020-07-22 ASSESSMENT — PAIN DESCRIPTION - DESCRIPTORS: DESCRIPTORS: CRAMPING

## 2020-07-22 NOTE — ED NOTES
Pt sleeping on cot. No signs of distress noted. Pt still hasn't given urine sample.       Aneudy Quijano RN  07/22/20 1183

## 2020-07-22 NOTE — ED PROVIDER NOTES
indicated that only one of her two sisters is alive. She indicated that her brother is alive. She indicated that her maternal grandmother is alive. She indicated that her maternal grandfather is . She indicated that her paternal grandmother is alive. She indicated that her paternal grandfather is . She indicated that her maternal aunt is alive. She indicated that the status of her neg hx is unknown.     family history includes Heart Defect in her maternal aunt and mother; No Known Problems in her brother, father, maternal grandfather, maternal grandmother, paternal grandfather, paternal grandmother, and sister; Seizures in her sister. SOCIAL HISTORY      reports that she has quit smoking. Her smoking use included cigars. She has never used smokeless tobacco. She reports current drug use. Frequency: 3.00 times per week. Drug: Marijuana. She reports that she does not drink alcohol. PHYSICAL EXAM     INITIAL VITALS:  oral temperature is 98.4 °F (36.9 °C). Her blood pressure is 109/76 and her pulse is 84. Her respiration is 18 and oxygen saturation is 100%. Physical Exam  Vitals signs reviewed. Constitutional:       Appearance: Normal appearance. Comments: Rossy QUESADA Escort for exam   HENT:      Head: Normocephalic and atraumatic. Mouth/Throat:      Mouth: Mucous membranes are moist.   Eyes:      General: No scleral icterus. Neck:      Musculoskeletal: Normal range of motion and neck supple. Cardiovascular:      Rate and Rhythm: Normal rate and regular rhythm. Heart sounds: No murmur. Pulmonary:      Effort: Pulmonary effort is normal. No respiratory distress. Breath sounds: No wheezing. Abdominal:      General: Abdomen is flat. There is no distension. Palpations: There is no mass. Tenderness: There is abdominal tenderness. There is no guarding or rebound.       Comments: Rossy QUESADA escort for exam,   Mild upper abdominal tenderness, no distension, no rigidity, no rebound, no peritoneal finding,    Musculoskeletal: Normal range of motion. General: No swelling or tenderness. Skin:     General: Skin is warm and dry. Neurological:      General: No focal deficit present. Mental Status: She is alert and oriented to person, place, and time. Psychiatric:         Mood and Affect: Mood normal.         Behavior: Behavior normal.         DIFFERENTIAL DIAGNOSIS/ MDM:     Lab / fluids / meds started >>>   Care will be turned over to day shift,   Benign abdominal exam at this time,     DIAGNOSTIC RESULTS     EKG:All EKG's are interpreted by the Emergency Department Physician who either signs or Co-signs this chart in the absence of a cardiologist.    0    RADIOLOGY:   I directly visualized the following  images and reviewed the radiologist interpretations:  No orders to display      none      ED BEDSIDE ULTRASOUND:   0    LABS:  Labs Reviewed   CBC WITH AUTO DIFFERENTIAL   COMPREHENSIVE METABOLIC PANEL W/ REFLEX TO MG FOR LOW K   LIPASE   HCG, SERUM, QUALITATIVE   URINE RT REFLEX TO CULTURE           EMERGENCY DEPARTMENT COURSE:   Vitals:    Vitals:    07/22/20 0622   BP: 109/76   Pulse: 84   Resp: 18   Temp: 98.4 °F (36.9 °C)   TempSrc: Oral   SpO2: 100%     -------------------------  BP: 109/76, Temp: 98.4 °F (36.9 °C), Pulse: 84, Resp: 18        CRITICAL CARE:   None        CONSULTS:      PROCEDURES:  None    FINAL IMPRESSION      1. Pain of upper abdomen          DISPOSITION/PLAN   DISPOSITION        PATIENT REFERRED TO:  No follow-up provider specified.     DISCHARGE MEDICATIONS:  New Prescriptions    No medications on file       (Please note that portions of this note were completed with a voice recognition program.  Efforts were made to edit the dictations butoccasionally words are mis-transcribed.)    Gladys Pace,, DO  Attending Emergency Physician                 Sandrita Collazo, DO  07/22/20 [address], DO  07/22/20 0700       Ethel Campbell Tiffany Mendosa DO  07/22/20 0179

## 2020-07-22 NOTE — ED PROVIDER NOTES
Care of Ayana Rodriguez was assumed from previous attending and is being seen for Abdominal Pain (pt tried to vomit after being woken by pain then called 911 says not pregnant UID in place)  . The patient's initial evaluation and plan have been discussed with the prior provider who initially evaluated the patient. Handoff taken on the following patient from prior Attending Physician:    Sonido Martin    I was available and discussed any additional care issues that arose and coordinated the management plans with the resident(s) caring for the patient during my duty period. Any areas of disagreement with residents documentation of care or procedures are noted on the chart. I was personally present for the key portions of any/all procedures during my duty period. I have documented in the chart those procedures where I was not present during the key portions. EMERGENCY DEPARTMENT COURSE / MEDICAL DECISION MAKING:       MEDICATIONS GIVEN:  Orders Placed This Encounter   Medications    0.9 % sodium chloride bolus    pantoprazole (PROTONIX) injection 40 mg    diphenhydrAMINE (BENADRYL) injection 12.5 mg       LABS / RADIOLOGY:     Labs Reviewed   CBC WITH AUTO DIFFERENTIAL - Abnormal; Notable for the following components:       Result Value    WBC 11.8 (*)     RDW 15.4 (*)     Seg Neutrophils 72 (*)     Lymphocytes 20 (*)     Eosinophils % 0 (*)     Segs Absolute 8.47 (*)     All other components within normal limits   COMPREHENSIVE METABOLIC PANEL W/ REFLEX TO MG FOR LOW K - Abnormal; Notable for the following components:    Glucose 154 (*)     Potassium 3.1 (*)     All other components within normal limits   LIPASE   HCG, SERUM, QUALITATIVE   MAGNESIUM   URINE RT REFLEX TO CULTURE       No results found. RECENT VITALS:     Temp: 98.4 °F (36.9 °C),  Pulse: 84, Resp: 18, BP: 109/76, SpO2: 100 %    This patient is a 22 y.o.  Female with abdominal pain and episode of vomiting this morning    OUTSTANDING TASKS / RECOMMENDATIONS:    1. Labs reviewed with patient,  2. She is feeling better at this time, 8:05 AM EDT  3. Abdomen soft,and non tender to palpation  4.  Plan to feed patient and discharge      Giovanny Kim DO  Attending Emergency Physician  [facility] [address] [facility]        [place], [place]  07/22/20 8085
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
## 2021-02-26 ENCOUNTER — APPOINTMENT (OUTPATIENT)
Dept: GENERAL RADIOLOGY | Age: 26
End: 2021-02-26
Payer: MEDICARE

## 2021-02-26 ENCOUNTER — HOSPITAL ENCOUNTER (EMERGENCY)
Age: 26
Discharge: HOME | End: 2021-02-26
Attending: EMERGENCY MEDICINE
Payer: MEDICARE

## 2021-02-26 VITALS
OXYGEN SATURATION: 97 % | DIASTOLIC BLOOD PRESSURE: 79 MMHG | HEART RATE: 68 BPM | TEMPERATURE: 97.2 F | RESPIRATION RATE: 18 BRPM | SYSTOLIC BLOOD PRESSURE: 117 MMHG

## 2021-02-26 DIAGNOSIS — M79.642 LEFT HAND PAIN: Primary | ICD-10-CM

## 2021-02-26 PROCEDURE — 99283 EMERGENCY DEPT VISIT LOW MDM: CPT

## 2021-02-26 PROCEDURE — 6370000000 HC RX 637 (ALT 250 FOR IP): Performed by: STUDENT IN AN ORGANIZED HEALTH CARE EDUCATION/TRAINING PROGRAM

## 2021-02-26 PROCEDURE — 73130 X-RAY EXAM OF HAND: CPT

## 2021-02-26 RX ORDER — ACETAMINOPHEN 325 MG/1
650 TABLET ORAL EVERY 6 HOURS PRN
Qty: 30 TABLET | Refills: 0 | Status: SHIPPED | OUTPATIENT
Start: 2021-02-26 | End: 2021-12-19

## 2021-02-26 RX ORDER — ACETAMINOPHEN 500 MG
1000 TABLET ORAL ONCE
Status: COMPLETED | OUTPATIENT
Start: 2021-02-26 | End: 2021-02-26

## 2021-02-26 RX ORDER — IBUPROFEN 600 MG/1
600 TABLET ORAL EVERY 6 HOURS PRN
Qty: 30 TABLET | Refills: 0 | Status: SHIPPED | OUTPATIENT
Start: 2021-02-26

## 2021-02-26 RX ADMIN — ACETAMINOPHEN 1000 MG: 500 TABLET ORAL at 20:08

## 2021-02-26 ASSESSMENT — PAIN DESCRIPTION - DESCRIPTORS: DESCRIPTORS: ACHING

## 2021-02-26 ASSESSMENT — PAIN DESCRIPTION - ORIENTATION: ORIENTATION: LEFT
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
## 2021-02-27 NOTE — ED PROVIDER NOTES
[facility]     Emergency Department     Faculty Attestation    I performed a history and physical examination of the patient and discussed management with the resident. I reviewed the residents note and agree with the documented findings including all diagnostic interpretations and plan of care. Any areas of disagreement are noted on the chart. I was personally present for the key portions of any procedures. I have documented in the chart those procedures where I was not present during the key portions. I have reviewed the emergency nurses triage note. I agree with the chief complaint, past medical history, past surgical history, allergies, medications, social and family history as documented unless otherwise noted below. Documentation of the HPI, Physical Exam and Medical Decision Making performed by scribюлия is based on my personal performance of the HPI, PE and MDM. For Physician Assistant/ Nurse Practitioner cases/documentation I have personally evaluated this patient and have completed at least one if not all key elements of the E/M (history, physical exam, and MDM). Additional findings are as noted. This patient was evaluated in the Emergency Department for symptoms described in the history of present illness. He/she was evaluated in the context of the global COVID-19 pandemic, which necessitated consideration that the patient might be at risk for infection with the SARS-CoV-2 virus that causes COVID-19. Institutional protocols and algorithms that pertain to the evaluation of patients at risk for COVID-19 are in a state of rapid change based on information released by regulatory bodies including the CDC and federal and state organizations. These policies and algorithms were followed during the patient's care in the ED. Primary Care Physician: No primary care provider on file. History:  This is a 26 y.o. female who presents to the Emergency Department with complaint of hand injury. 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 No numbness no weakness no other injuries no blood thinner use. Physical:     temporal temperature is 97.2 °F (36.2 °C). Her blood pressure is 117/79 and her pulse is 68. Her respiration is 18 and oxygen saturation is 97%.    26 y.o. female no acute distress, there is swelling through the left index finger there is pain with passive stretch but no fusiform swelling no erythema. Good resistance.     Impression: Extensor tendon sprain    Plan: X-ray to evaluate for potential fracture, symptomatic treatment      Everton Cota MD, Val Davila  Attending Emergency Physician         Zeinab Peralta MD  02/26/21 2028

## 2021-02-27 NOTE — ED NOTES
Pt arrives to ED via self. Pt complains of L hand pain. 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 Pt has full ROM. Pt states the pain travels up her forearm. rr even and unlabored. BAUDILIO Singer, DIPAK  02/26/21 1945

## 2021-05-11 ENCOUNTER — HOSPITAL ENCOUNTER (EMERGENCY)
Age: 26
Discharge: HOME | End: 2021-05-11
Attending: EMERGENCY MEDICINE
Payer: MEDICARE

## 2021-05-11 VITALS
DIASTOLIC BLOOD PRESSURE: 86 MMHG | OXYGEN SATURATION: 98 % | RESPIRATION RATE: 18 BRPM | HEART RATE: 70 BPM | SYSTOLIC BLOOD PRESSURE: 122 MMHG | TEMPERATURE: 97.6 F

## 2021-05-11 DIAGNOSIS — R10.2 VAGINAL PAIN: Primary | ICD-10-CM

## 2021-05-11 PROBLEM — O09.893 SHORT INTERVAL BETWEEN PREGNANCIES AFFECTING PREGNANCY IN THIRD TRIMESTER, ANTEPARTUM: Status: RESOLVED | Noted: 2019-12-03 | Resolved: 2021-05-11

## 2021-05-11 PROBLEM — Z3A.39 39 WEEKS GESTATION OF PREGNANCY: Status: RESOLVED | Noted: 2019-12-03 | Resolved: 2021-05-11

## 2021-05-11 PROBLEM — Z30.430 ENCOUNTER FOR INSERTION OF MIRENA IUD: Status: RESOLVED | Noted: 2019-12-04 | Resolved: 2021-05-11

## 2021-05-11 PROBLEM — O09.30 NO PRENATAL CARE IN CURRENT PREGNANCY: Status: RESOLVED | Noted: 2019-10-08 | Resolved: 2021-05-11

## 2021-05-11 LAB
-: NORMAL
AMORPHOUS: NORMAL
BACTERIA: NORMAL
BILIRUBIN URINE: NEGATIVE
CASTS UA: NORMAL /LPF (ref 0–8)
COLOR: YELLOW
COMMENT UA: ABNORMAL
CRYSTALS, UA: NORMAL /HPF
DIRECT EXAM: NORMAL
EPITHELIAL CELLS UA: NORMAL /HPF (ref 0–5)
GLUCOSE URINE: NEGATIVE
HCG(URINE) PREGNANCY TEST: NEGATIVE
KETONES, URINE: ABNORMAL
LEUKOCYTE ESTERASE, URINE: ABNORMAL
Lab: NORMAL
MUCUS: NORMAL
NITRITE, URINE: NEGATIVE
OTHER OBSERVATIONS UA: NORMAL
PH UA: >9 (ref 5–8)
PROTEIN UA: ABNORMAL
RBC UA: NORMAL /HPF (ref 0–4)
RENAL EPITHELIAL, UA: NORMAL /HPF
SPECIFIC GRAVITY UA: 1.03 (ref 1–1.03)
SPECIMEN DESCRIPTION: NORMAL
TRICHOMONAS: NORMAL
TURBIDITY: ABNORMAL
URINE HGB: NEGATIVE
UROBILINOGEN, URINE: NORMAL
WBC UA: NORMAL /HPF (ref 0–5)
YEAST: NORMAL

## 2021-05-11 PROCEDURE — 6360000002 HC RX W HCPCS: Performed by: STUDENT IN AN ORGANIZED HEALTH CARE EDUCATION/TRAINING PROGRAM

## 2021-05-11 PROCEDURE — 87491 CHLMYD TRACH DNA AMP PROBE: CPT

## 2021-05-11 PROCEDURE — 87480 CANDIDA DNA DIR PROBE: CPT

## 2021-05-11 PROCEDURE — 87510 GARDNER VAG DNA DIR PROBE: CPT

## 2021-05-11 PROCEDURE — 87660 TRICHOMONAS VAGIN DIR PROBE: CPT

## 2021-05-11 PROCEDURE — 99285 EMERGENCY DEPT VISIT HI MDM: CPT

## 2021-05-11 PROCEDURE — 87591 N.GONORRHOEAE DNA AMP PROB: CPT

## 2021-05-11 PROCEDURE — 81001 URINALYSIS AUTO W/SCOPE: CPT

## 2021-05-11 PROCEDURE — 6370000000 HC RX 637 (ALT 250 FOR IP): Performed by: STUDENT IN AN ORGANIZED HEALTH CARE EDUCATION/TRAINING PROGRAM

## 2021-05-11 PROCEDURE — 87086 URINE CULTURE/COLONY COUNT: CPT

## 2021-05-11 PROCEDURE — 81025 URINE PREGNANCY TEST: CPT

## 2021-05-11 PROCEDURE — 96372 THER/PROPH/DIAG INJ SC/IM: CPT

## 2021-05-11 RX ORDER — ACETAMINOPHEN 500 MG
1000 TABLET ORAL ONCE
Status: COMPLETED | OUTPATIENT
Start: 2021-05-11 | End: 2021-05-11

## 2021-05-11 RX ORDER — AMOXICILLIN AND CLAVULANATE POTASSIUM 875; 125 MG/1; MG/1
1 TABLET, FILM COATED ORAL 2 TIMES DAILY
Qty: 10 TABLET | Refills: 0 | Status: SHIPPED | OUTPATIENT
Start: 2021-05-11 | End: 2021-05-16

## 2021-05-11 RX ORDER — KETOROLAC TROMETHAMINE 30 MG/ML
30 INJECTION, SOLUTION INTRAMUSCULAR; INTRAVENOUS ONCE
Status: COMPLETED | OUTPATIENT
Start: 2021-05-11 | End: 2021-05-11

## 2021-05-11 RX ORDER — ONDANSETRON 4 MG/1
4 TABLET, ORALLY DISINTEGRATING ORAL ONCE
Status: COMPLETED | OUTPATIENT
Start: 2021-05-11 | End: 2021-05-11

## 2021-05-11 RX ORDER — AMOXICILLIN AND CLAVULANATE POTASSIUM 875; 125 MG/1; MG/1
1 TABLET, FILM COATED ORAL ONCE
Status: COMPLETED | OUTPATIENT
Start: 2021-05-11 | End: 2021-05-11

## 2021-05-11 RX ADMIN — AMOXICILLIN AND CLAVULANATE POTASSIUM 1 TABLET: 875; 125 TABLET, FILM COATED ORAL at 16:41

## 2021-05-11 RX ADMIN — ONDANSETRON 4 MG: 4 TABLET, ORALLY DISINTEGRATING ORAL at 15:14

## 2021-05-11 RX ADMIN — KETOROLAC TROMETHAMINE 30 MG: 30 INJECTION, SOLUTION INTRAMUSCULAR; INTRAVENOUS at 15:14

## 2021-05-11 RX ADMIN — ACETAMINOPHEN 1000 MG: 500 TABLET ORAL at 13:55

## 2021-05-11 ASSESSMENT — PAIN DESCRIPTION - DESCRIPTORS: DESCRIPTORS: CRAMPING

## 2021-05-11 ASSESSMENT — PAIN DESCRIPTION - ORIENTATION: ORIENTATION: LOWER

## 2021-05-11 ASSESSMENT — PAIN DESCRIPTION - LOCATION: LOCATION: ABDOMEN

## 2021-05-11 ASSESSMENT — PAIN SCALES - GENERAL: PAINLEVEL_OUTOF10: 8

## 2021-05-11 NOTE — ED NOTES
Labeled pelvic specimen collected and sent to lab via tube system.        Prashant Vela RN  05/11/21 9427
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
## 2021-05-11 NOTE — ED PROVIDER NOTES
[facility]     Emergency Department     Faculty Attestation    I performed a history and physical examination of the patient and discussed management with the resident. I reviewed the residents note and agree with the documented findings and plan of care. Any areas of disagreement are noted on the chart. I was personally present for the key portions of any procedures. I have documented in the chart those procedures where I was not present during the key portions. I have reviewed the emergency nurses triage note. I agree with the chief complaint, past medical history, past surgical history, allergies, medications, social and family history as documented unless otherwise noted below. For Physician Assistant/ Nurse Practitioner cases/documentation I have personally evaluated this patient and have completed at least one if not all key elements of the E/M (history, physical exam, and MDM). Additional findings are as noted. I have personally seen and evaluated the patient. I find the patient's history and physical exam are consistent with the NP/PA documentation. I agree with the care provided, treatment rendered, disposition and follow-up plan. 52-year-old female with a history of IUD placed in December 2019 presenting with abdominal pain. Believes that it is secondary to her IUD being misplaced. Pain feels like contractions, is continuous. Ibuprofen has not helped. She denies any vaginal bleeding. She does not typically get menstrual cycles. She intermittently spots but with no regularity. Denies any vaginal discharge.     Exam:  General: Laying on the bed, awake, alert and in no acute distress  CV: normal rate and regular rhythm  Lungs: Breathing comfortably on room air with no tachypnea, hypoxia, or increased work of breathing    Plan:  Pelvic exam completed by resident with no significant cervical motion tenderness, strings of IUD visible  Discussed with OB regarding IUD    OB consented patient to remove IUD. While removing IUD, make-up sponge was found in the vagina. This was also removed. Will treat patient with Augmentin given risk of TSS.   Follow-up with OB in clinic          Geremias Barreto MD   Attending Emergency  Physician    (Please note that portions of this note were completed with a voice recognition program. Efforts were made to edit the dictations but occasionally words are mis-transcribed.)             Geremias Barreto MD  05/11/21 3405
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
## 2021-05-11 NOTE — ED NOTES
Labeled urine specimen collected and sent to lab via tube system.   OB at bedside     Sparkle Hsu RN  05/11/21 4615

## 2021-12-19 ENCOUNTER — HOSPITAL ENCOUNTER (EMERGENCY)
Age: 26
Discharge: HOME | End: 2021-12-19
Attending: EMERGENCY MEDICINE
Payer: MEDICARE

## 2021-12-19 ENCOUNTER — APPOINTMENT (OUTPATIENT)
Dept: GENERAL RADIOLOGY | Age: 26
End: 2021-12-19
Payer: MEDICARE

## 2021-12-19 VITALS
DIASTOLIC BLOOD PRESSURE: 83 MMHG | SYSTOLIC BLOOD PRESSURE: 116 MMHG | TEMPERATURE: 96.8 F | OXYGEN SATURATION: 98 % | BODY MASS INDEX: 21.97 KG/M2 | RESPIRATION RATE: 18 BRPM | WEIGHT: 140 LBS | HEIGHT: 67 IN | HEART RATE: 99 BPM

## 2021-12-19 DIAGNOSIS — Z32.01 POSITIVE URINE PREGNANCY TEST: Primary | ICD-10-CM

## 2021-12-19 DIAGNOSIS — R11.2 NON-INTRACTABLE VOMITING WITH NAUSEA, UNSPECIFIED VOMITING TYPE: ICD-10-CM

## 2021-12-19 LAB
-: ABNORMAL
AMORPHOUS: ABNORMAL
BACTERIA: ABNORMAL
BILIRUBIN URINE: NEGATIVE
CASTS UA: ABNORMAL /LPF (ref 0–8)
COLOR: YELLOW
CRYSTALS, UA: ABNORMAL /HPF
EPITHELIAL CELLS UA: ABNORMAL /HPF (ref 0–5)
GLUCOSE URINE: NEGATIVE
HCG(URINE) PREGNANCY TEST: POSITIVE
KETONES, URINE: ABNORMAL
LEUKOCYTE ESTERASE, URINE: ABNORMAL
MUCUS: ABNORMAL
NITRITE, URINE: NEGATIVE
OTHER OBSERVATIONS UA: ABNORMAL
PH UA: 6 (ref 5–8)
PROTEIN UA: ABNORMAL
RBC UA: ABNORMAL /HPF (ref 0–4)
RENAL EPITHELIAL, UA: ABNORMAL /HPF
SARS-COV-2, RAPID: NOT DETECTED
SPECIFIC GRAVITY UA: 1.02 (ref 1–1.03)
SPECIMEN DESCRIPTION: NORMAL
TRICHOMONAS: ABNORMAL
TURBIDITY: ABNORMAL
URINE HGB: NEGATIVE
UROBILINOGEN, URINE: NORMAL
WBC UA: ABNORMAL /HPF (ref 0–5)
YEAST: ABNORMAL

## 2021-12-19 PROCEDURE — 99283 EMERGENCY DEPT VISIT LOW MDM: CPT

## 2021-12-19 PROCEDURE — 6370000000 HC RX 637 (ALT 250 FOR IP): Performed by: STUDENT IN AN ORGANIZED HEALTH CARE EDUCATION/TRAINING PROGRAM

## 2021-12-19 PROCEDURE — 2580000003 HC RX 258: Performed by: STUDENT IN AN ORGANIZED HEALTH CARE EDUCATION/TRAINING PROGRAM

## 2021-12-19 PROCEDURE — 87635 SARS-COV-2 COVID-19 AMP PRB: CPT

## 2021-12-19 PROCEDURE — 81001 URINALYSIS AUTO W/SCOPE: CPT

## 2021-12-19 PROCEDURE — 96360 HYDRATION IV INFUSION INIT: CPT

## 2021-12-19 PROCEDURE — 81025 URINE PREGNANCY TEST: CPT

## 2021-12-19 PROCEDURE — 71045 X-RAY EXAM CHEST 1 VIEW: CPT

## 2021-12-19 RX ORDER — 0.9 % SODIUM CHLORIDE 0.9 %
1000 INTRAVENOUS SOLUTION INTRAVENOUS ONCE
Status: COMPLETED | OUTPATIENT
Start: 2021-12-19 | End: 2021-12-19

## 2021-12-19 RX ORDER — ACETAMINOPHEN 325 MG/1
650 TABLET ORAL ONCE
Status: COMPLETED | OUTPATIENT
Start: 2021-12-19 | End: 2021-12-19

## 2021-12-19 RX ORDER — METOCLOPRAMIDE 10 MG/1
10 TABLET ORAL 3 TIMES DAILY
Qty: 15 TABLET | Refills: 0 | Status: SHIPPED | OUTPATIENT
Start: 2021-12-19 | End: 2021-12-24

## 2021-12-19 RX ORDER — ACETAMINOPHEN 500 MG
500 TABLET ORAL EVERY 6 HOURS PRN
Qty: 20 TABLET | Refills: 0 | Status: SHIPPED | OUTPATIENT
Start: 2021-12-19 | End: 2021-12-24

## 2021-12-19 RX ORDER — CEPHALEXIN 500 MG/1
500 CAPSULE ORAL 2 TIMES DAILY
Qty: 14 CAPSULE | Refills: 0 | Status: SHIPPED | OUTPATIENT
Start: 2021-12-19 | End: 2021-12-26

## 2021-12-19 RX ORDER — ONDANSETRON 4 MG/1
4 TABLET, ORALLY DISINTEGRATING ORAL ONCE
Status: COMPLETED | OUTPATIENT
Start: 2021-12-19 | End: 2021-12-19

## 2021-12-19 RX ADMIN — ACETAMINOPHEN 650 MG: 325 TABLET ORAL at 12:02

## 2021-12-19 RX ADMIN — SODIUM CHLORIDE 1000 ML: 9 INJECTION, SOLUTION INTRAVENOUS at 12:01

## 2021-12-19 RX ADMIN — ONDANSETRON 4 MG: 4 TABLET, ORALLY DISINTEGRATING ORAL at 12:02

## 2021-12-19 ASSESSMENT — ENCOUNTER SYMPTOMS
SHORTNESS OF BREATH: 0
NAUSEA: 1
CONSTIPATION: 0
ABDOMINAL PAIN: 1
BACK PAIN: 0
VOMITING: 1
COUGH: 0
DIARRHEA: 0

## 2021-12-19 ASSESSMENT — PAIN SCALES - GENERAL
PAINLEVEL_OUTOF10: 9
PAINLEVEL_OUTOF10: 6

## 2021-12-19 ASSESSMENT — PAIN DESCRIPTION - FREQUENCY: FREQUENCY: INTERMITTENT

## 2021-12-19 ASSESSMENT — PAIN DESCRIPTION - LOCATION: LOCATION: ABDOMEN;BACK

## 2021-12-19 NOTE — ED PROVIDER NOTES
[facility] ED     Emergency Department     Faculty Attestation        I performed a history and physical examination of the patient and discussed management with the resident. I reviewed the residents note and agree with the documented findings and plan of care. Any areas of disagreement are noted on the chart. I was personally present for the key portions of any procedures. I have documented in the chart those procedures where I was not present during the key portions. I have reviewed the emergency nurses triage note. I agree with the chief complaint, past medical history, past surgical history, allergies, medications, social and family history as documented unless otherwise noted below. For Physician Assistant/ Nurse Practitioner cases/documentation I have personally evaluated this patient and have completed at least one if not all key elements of the E/M (history, physical exam, and MDM). Additional findings are as noted. Vital Signs: BP: 116/83  Pulse: 99  Resp: 18  Temp: 96.8 °F (36 °C) SpO2: 98 %  PCP:  No primary care provider on file. Pertinent Comments:     Patient is a 76-year-old female with no significant previous past medical history who 2 to 3 days ago began having some subjective fevers and chills as well as diffuse muscle aches. Occasional cough with slight nasal congestion but no sore throat. No actual shortness of breath but will have palpitations occasionally on exertion. No obvious sick contacts at home. There has been some nausea and vomiting but no diarrhea. On exam patient has point clear to station bilateral with midline trachea, heart regular rhythm at this time. Abdomen is soft/nontender. There is no calf tenderness or swelling noted and strong DP pulses palpated and equal bilaterally. 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    Will obtain chest x-ray as well as Covid swab and symptomatic control and reevaluate after. Critical Care  None    This patient was evaluated in the Emergency Department for symptoms described in the history of present illness. He/she was evaluated in the context of the global COVID-19 pandemic, which necessitated consideration that the patient might be at risk for infection with the SARS-CoV-2 virus that causes COVID-19. Institutional protocols and algorithms that pertain to the evaluation of patients at risk for COVID-19 are in a state of rapid change based on information released by regulatory bodies including the CDC and federal and state organizations. These policies and algorithms were followed during the patient's care in the ED. (Please note that portions of this note were completed with a voice recognition program. Efforts were made to edit the dictations but occasionally words are mis-transcribed.  Whenever words are used in this note in any gender, they shall be construed as though they were used in the gender appropriate to the circumstances; and whenever words are used in this note in the singular or plural form, they shall be construed as though they were used in the form appropriate to the circumstances.)    MD Soila Moreno  Attending Emergency Medicine Physician             Adam Starr MD  12/19/21 1141       Adam Starr MD  12/19/21 1221
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
## 2021-12-19 NOTE — ED TRIAGE NOTES
Pt presented to the ED today with c/o nausea, vomiting, body aches and dizziness. Pt stated that symptoms started approximately 2 to 3 days ago.

## 2023-04-05 ENCOUNTER — HOSPITAL ENCOUNTER (EMERGENCY)
Age: 28
Discharge: HOME | End: 2023-04-05
Attending: EMERGENCY MEDICINE
Payer: MEDICAID

## 2023-04-05 VITALS
HEIGHT: 66 IN | TEMPERATURE: 97.5 F | WEIGHT: 140 LBS | HEART RATE: 96 BPM | OXYGEN SATURATION: 100 % | RESPIRATION RATE: 16 BRPM | BODY MASS INDEX: 22.5 KG/M2 | SYSTOLIC BLOOD PRESSURE: 135 MMHG | DIASTOLIC BLOOD PRESSURE: 83 MMHG

## 2023-04-05 DIAGNOSIS — B96.89 BACTERIAL VAGINOSIS: ICD-10-CM

## 2023-04-05 DIAGNOSIS — N30.00 ACUTE CYSTITIS WITHOUT HEMATURIA: Primary | ICD-10-CM

## 2023-04-05 DIAGNOSIS — N76.0 BACTERIAL VAGINOSIS: ICD-10-CM

## 2023-04-05 LAB
BACTERIA: ABNORMAL
BILIRUBIN URINE: NEGATIVE
CANDIDA SPECIES, DNA PROBE: NEGATIVE
CASTS UA: ABNORMAL /LPF (ref 0–8)
COLOR: YELLOW
EPITHELIAL CELLS UA: ABNORMAL /HPF (ref 0–5)
GARDNERELLA VAGINALIS, DNA PROBE: POSITIVE
GLUCOSE UR STRIP.AUTO-MCNC: NEGATIVE MG/DL
HCG(URINE) PREGNANCY TEST: NEGATIVE
KETONES UR STRIP.AUTO-MCNC: NEGATIVE MG/DL
LEUKOCYTE ESTERASE UR QL STRIP.AUTO: ABNORMAL
NITRITE UR QL STRIP.AUTO: NEGATIVE
PROT UR STRIP.AUTO-MCNC: 6 MG/DL (ref 5–8)
PROT UR STRIP.AUTO-MCNC: NEGATIVE MG/DL
RBC CLUMPS #/AREA URNS AUTO: ABNORMAL /HPF (ref 0–4)
SOURCE: ABNORMAL
SPECIFIC GRAVITY UA: 1.02 (ref 1–1.03)
TRICHOMONAS VAGINALIS DNA: NEGATIVE
TURBIDITY: ABNORMAL
URINE HGB: NEGATIVE
UROBILINOGEN, URINE: NORMAL
WBC UA: ABNORMAL /HPF (ref 0–5)

## 2023-04-05 PROCEDURE — 81001 URINALYSIS AUTO W/SCOPE: CPT

## 2023-04-05 PROCEDURE — 87660 TRICHOMONAS VAGIN DIR PROBE: CPT

## 2023-04-05 PROCEDURE — 87480 CANDIDA DNA DIR PROBE: CPT

## 2023-04-05 PROCEDURE — 81025 URINE PREGNANCY TEST: CPT

## 2023-04-05 PROCEDURE — 87591 N.GONORRHOEAE DNA AMP PROB: CPT

## 2023-04-05 PROCEDURE — 87510 GARDNER VAG DNA DIR PROBE: CPT

## 2023-04-05 PROCEDURE — 87491 CHLMYD TRACH DNA AMP PROBE: CPT

## 2023-04-05 RX ORDER — METRONIDAZOLE 500 MG/1
500 TABLET ORAL ONCE
Status: DISCONTINUED | OUTPATIENT
Start: 2023-04-05 | End: 2023-04-05 | Stop reason: HOSPADM

## 2023-04-05 RX ORDER — METRONIDAZOLE 500 MG/1
500 TABLET ORAL 2 TIMES DAILY
Qty: 13 TABLET | Refills: 0 | Status: SHIPPED | OUTPATIENT
Start: 2023-04-05 | End: 2023-04-12

## 2023-04-05 RX ORDER — CEPHALEXIN 500 MG/1
500 CAPSULE ORAL 2 TIMES DAILY
Qty: 13 CAPSULE | Refills: 0 | Status: SHIPPED | OUTPATIENT
Start: 2023-04-05 | End: 2023-04-12

## 2023-04-05 RX ORDER — CEPHALEXIN 500 MG/1
500 CAPSULE ORAL ONCE
Status: DISCONTINUED | OUTPATIENT
Start: 2023-04-05 | End: 2023-04-05 | Stop reason: HOSPADM

## 2023-04-05 ASSESSMENT — ENCOUNTER SYMPTOMS
BACK PAIN: 0
RHINORRHEA: 0
EYE PAIN: 0
NAUSEA: 0
COLOR CHANGE: 0
EYE DISCHARGE: 0
DIARRHEA: 0
SORE THROAT: 0
VOMITING: 0
ABDOMINAL PAIN: 1
SHORTNESS OF BREATH: 0
COUGH: 0

## 2023-04-05 ASSESSMENT — PAIN DESCRIPTION - LOCATION: LOCATION: ABDOMEN

## 2023-04-05 ASSESSMENT — PAIN SCALES - GENERAL: PAINLEVEL_OUTOF10: 6

## 2023-04-05 ASSESSMENT — PAIN DESCRIPTION - FREQUENCY: FREQUENCY: CONTINUOUS

## 2023-04-05 ASSESSMENT — PAIN - FUNCTIONAL ASSESSMENT: PAIN_FUNCTIONAL_ASSESSMENT: 0-10

## 2023-04-05 NOTE — ED NOTES
Pt to ED for abdominal pain and increased vaginal discharge. She also states some urinary urgency. No visual trauma noted on abdomen. Says she's not pregnant.       Pam Porter RN  04/05/23 2300

## 2023-04-05 NOTE — ED PROVIDER NOTES
Patient is resting comfortably on the bed and appears well   HENT:      Head: Normocephalic and atraumatic. Cardiovascular:      Rate and Rhythm: Normal rate and regular rhythm. Pulmonary:      Effort: Pulmonary effort is normal. No respiratory distress. Breath sounds: Normal breath sounds. Abdominal:      Palpations: Abdomen is soft. Tenderness: There is abdominal tenderness in the suprapubic area. There is no right CVA tenderness, left CVA tenderness, guarding or rebound. Negative signs include Rovsing's sign and McBurney's sign. Genitourinary:     Vagina: No foreign body. Vaginal discharge (Mild amount of white discharge) present. Cervix: Normal.      Adnexa: Right adnexa normal and left adnexa normal.   Skin:     General: Skin is warm and dry. Neurological:      General: No focal deficit present. Mental Status: She is alert and oriented to person, place, and time. Psychiatric:         Mood and Affect: Mood normal.         Behavior: Behavior normal.       DIFFERENTIAL DIAGNOSIS/ MDM:     We will check urinalysis, pregnancy test, vaginitis labs. Will reassess. Labs are remarkable for UTI and bacterial vaginosis. Will discharge with prescriptions for Keflex and Flagyl.     DIAGNOSTIC RESULTS     EKG: All EKG's are interpreted by the Emergency Department Physician who either signs or Co-signs this chart in the absence of a cardiologist.    Not Indicated    RADIOLOGY:   I directly visualized the following  images and reviewed the radiologist interpretations:   Not indicated      ED BEDSIDE ULTRASOUND:   Not indicated    LABS:  Labs Reviewed   VAGINITIS DNA PROBE - Abnormal; Notable for the following components:       Result Value    Gard[place]rella Vaginalis, DNA Probe POSITIVE (*)     All other components within normal limits   URINALYSIS WITH MICROSCOPIC - Abnormal; Notable for the following components:    Turbidity UA Cloudy (*)     Leukocyte Esterase, Urine MODERATE (*)

## 2023-04-06 LAB
C TRACH DNA SPEC QL PROBE+SIG AMP: ABNORMAL
N GONORRHOEA DNA SPEC QL PROBE+SIG AMP: NEGATIVE
SPECIMEN DESCRIPTION: ABNORMAL

## 2023-04-10 RX ORDER — DOXYCYCLINE HYCLATE 100 MG
100 TABLET ORAL 2 TIMES DAILY
Qty: 14 TABLET | Refills: 0 | Status: SHIPPED | OUTPATIENT
Start: 2023-04-10 | End: 2023-04-17

## 2023-04-13 ENCOUNTER — HOSPITAL ENCOUNTER (EMERGENCY)
Age: 28
Discharge: HOME | End: 2023-04-13
Attending: EMERGENCY MEDICINE
Payer: MEDICAID

## 2023-04-13 VITALS
BODY MASS INDEX: 21.22 KG/M2 | HEART RATE: 92 BPM | SYSTOLIC BLOOD PRESSURE: 115 MMHG | TEMPERATURE: 98.4 F | DIASTOLIC BLOOD PRESSURE: 76 MMHG | HEIGHT: 68 IN | RESPIRATION RATE: 18 BRPM | OXYGEN SATURATION: 100 % | WEIGHT: 140 LBS

## 2023-04-13 DIAGNOSIS — R10.10 PAIN OF UPPER ABDOMEN: Primary | ICD-10-CM

## 2023-04-13 LAB
ABSOLUTE EOS #: 0 K/UL (ref 0–0.4)
ABSOLUTE IMMATURE GRANULOCYTE: 0.09 K/UL (ref 0–0.3)
ABSOLUTE LYMPH #: 3.26 K/UL (ref 1–4.8)
ABSOLUTE MONO #: 1.32 K/UL (ref 0.1–0.8)
ALBUMIN SERPL-MCNC: 3.9 G/DL (ref 3.5–5.2)
ALBUMIN/GLOBULIN RATIO: 1.1 (ref 1–2.5)
ALP SERPL-CCNC: 54 U/L (ref 35–104)
ALT SERPL-CCNC: 9 U/L (ref 5–33)
ANION GAP SERPL CALCULATED.3IONS-SCNC: 12 MMOL/L (ref 9–17)
AST SERPL-CCNC: 17 U/L
BASOPHILS # BLD: 0 % (ref 0–2)
BASOPHILS ABSOLUTE: 0 K/UL (ref 0–0.2)
BILIRUB DIRECT SERPL-MCNC: <0.1 MG/DL
BILIRUB INDIRECT SERPL-MCNC: ABNORMAL MG/DL (ref 0–1)
BILIRUB SERPL-MCNC: <0.1 MG/DL (ref 0.3–1.2)
BUN SERPL-MCNC: 9 MG/DL (ref 6–20)
CALCIUM SERPL-MCNC: 9.4 MG/DL (ref 8.6–10.4)
CHLORIDE SERPL-SCNC: 104 MMOL/L (ref 98–107)
CO2 SERPL-SCNC: 22 MMOL/L (ref 20–31)
CREAT SERPL-MCNC: 0.83 MG/DL (ref 0.5–0.9)
EOSINOPHILS RELATIVE PERCENT: 0 % (ref 1–4)
GFR SERPL CREATININE-BSD FRML MDRD: >60 ML/MIN/1.73M2
GLUCOSE SERPL-MCNC: 120 MG/DL (ref 70–99)
HCG QUALITATIVE: NEGATIVE
HCT VFR BLD AUTO: 35.1 % (ref 36.3–47.1)
HGB BLD-MCNC: 11.7 G/DL (ref 11.9–15.1)
IMMATURE GRANULOCYTES: 1 %
LIPASE SERPL-CCNC: 41 U/L (ref 13–60)
LYMPHOCYTES # BLD: 37 % (ref 24–44)
MAGNESIUM SERPL-MCNC: 1.7 MG/DL (ref 1.6–2.6)
MCH RBC QN AUTO: 30.3 PG (ref 25.2–33.5)
MCHC RBC AUTO-ENTMCNC: 33.3 G/DL (ref 28.4–34.8)
MCV RBC AUTO: 90.9 FL (ref 82.6–102.9)
MONOCYTES # BLD: 15 % (ref 1–7)
MORPHOLOGY: ABNORMAL
NRBC AUTOMATED: 0 PER 100 WBC
PDW BLD-RTO: 14.8 % (ref 11.8–14.4)
PLATELET # BLD AUTO: 243 K/UL (ref 138–453)
PMV BLD AUTO: 9.8 FL (ref 8.1–13.5)
POTASSIUM SERPL-SCNC: 3.9 MMOL/L (ref 3.7–5.3)
PROT SERPL-MCNC: 7.3 G/DL (ref 6.4–8.3)
RBC # BLD: 3.86 M/UL (ref 3.95–5.11)
SEG NEUTROPHILS: 47 % (ref 36–66)
SEGMENTED NEUTROPHILS ABSOLUTE COUNT: 4.13 K/UL (ref 1.8–7.7)
SODIUM SERPL-SCNC: 138 MMOL/L (ref 135–144)
WBC # BLD AUTO: 8.8 K/UL (ref 3.5–11.3)

## 2023-04-13 PROCEDURE — 83735 ASSAY OF MAGNESIUM: CPT

## 2023-04-13 PROCEDURE — 96375 TX/PRO/DX INJ NEW DRUG ADDON: CPT

## 2023-04-13 PROCEDURE — 6370000000 HC RX 637 (ALT 250 FOR IP): Performed by: STUDENT IN AN ORGANIZED HEALTH CARE EDUCATION/TRAINING PROGRAM

## 2023-04-13 PROCEDURE — 85025 COMPLETE CBC W/AUTO DIFF WBC: CPT

## 2023-04-13 PROCEDURE — 83690 ASSAY OF LIPASE: CPT

## 2023-04-13 PROCEDURE — 99284 EMERGENCY DEPT VISIT MOD MDM: CPT

## 2023-04-13 PROCEDURE — 84703 CHORIONIC GONADOTROPIN ASSAY: CPT

## 2023-04-13 PROCEDURE — 6360000002 HC RX W HCPCS: Performed by: STUDENT IN AN ORGANIZED HEALTH CARE EDUCATION/TRAINING PROGRAM

## 2023-04-13 PROCEDURE — 80076 HEPATIC FUNCTION PANEL: CPT

## 2023-04-13 PROCEDURE — 80048 BASIC METABOLIC PNL TOTAL CA: CPT

## 2023-04-13 PROCEDURE — 96374 THER/PROPH/DIAG INJ IV PUSH: CPT

## 2023-04-13 RX ORDER — PANTOPRAZOLE SODIUM 40 MG/1
40 TABLET, DELAYED RELEASE ORAL ONCE
Status: COMPLETED | OUTPATIENT
Start: 2023-04-13 | End: 2023-04-13

## 2023-04-13 RX ORDER — MORPHINE SULFATE 4 MG/ML
4 INJECTION, SOLUTION INTRAMUSCULAR; INTRAVENOUS ONCE
Status: COMPLETED | OUTPATIENT
Start: 2023-04-13 | End: 2023-04-13

## 2023-04-13 RX ORDER — PANTOPRAZOLE SODIUM 20 MG/1
20 TABLET, DELAYED RELEASE ORAL DAILY
Qty: 30 TABLET | Refills: 0 | Status: SHIPPED | OUTPATIENT
Start: 2023-04-13

## 2023-04-13 RX ORDER — ONDANSETRON 4 MG/1
4 TABLET, FILM COATED ORAL EVERY 8 HOURS PRN
Qty: 10 TABLET | Refills: 0 | Status: SHIPPED | OUTPATIENT
Start: 2023-04-13

## 2023-04-13 RX ORDER — ONDANSETRON 2 MG/ML
4 INJECTION INTRAMUSCULAR; INTRAVENOUS ONCE
Status: COMPLETED | OUTPATIENT
Start: 2023-04-13 | End: 2023-04-13

## 2023-04-13 RX ADMIN — MORPHINE SULFATE 4 MG: 4 INJECTION INTRAVENOUS at 03:02

## 2023-04-13 RX ADMIN — PANTOPRAZOLE SODIUM 40 MG: 40 TABLET, DELAYED RELEASE ORAL at 03:48

## 2023-04-13 RX ADMIN — ONDANSETRON 4 MG: 2 INJECTION INTRAMUSCULAR; INTRAVENOUS at 03:02

## 2023-04-13 ASSESSMENT — PAIN SCALES - GENERAL: PAINLEVEL_OUTOF10: 8

## 2023-04-13 ASSESSMENT — ENCOUNTER SYMPTOMS
DIARRHEA: 0
BACK PAIN: 0
VOMITING: 0
NAUSEA: 1
ABDOMINAL PAIN: 1
SHORTNESS OF BREATH: 0

## 2023-04-13 ASSESSMENT — PAIN - FUNCTIONAL ASSESSMENT: PAIN_FUNCTIONAL_ASSESSMENT: 0-10
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
## 2023-04-13 NOTE — ED PROVIDER NOTES
[facility]     Emergency Department     Faculty Attestation    I performed a history and physical examination of the patient and discussed management with the resident. I reviewed the residents note and agree with the documented findings and plan of care. Any areas of disagreement are noted on the chart. I was personally present for the key portions of any procedures. I have documented in the chart those procedures where I was not present during the key portions. I have reviewed the emergency nurses triage note. I agree with the chief complaint, past medical history, past surgical history, allergies, medications, social and family history as documented unless otherwise noted below. For Physician Assistant/ Nurse Practitioner cases/documentation I have personally evaluated this patient and have completed at least one if not all key elements of the E/M (history, physical exam, and MDM). Additional findings are as noted. UTI and bacterial vaginosis treated with Keflex and Flagyl presents with right upper quadrant abdominal pain. Says that her urinary and vaginal symptoms have resolved and she is having a lot of right upper quadrant pain. Patient says it started on Easter after she drank alcohol. She denies fever, dyspnea, shortness of breath, nausea, vomiting. On exam, patient is lying in bed appears mildly comfortable good auscultation bilaterally heart sounds are noted abdomen soft with moderate right upper quadrant tenderness but no rebound or guarding present. We will check labs and treat patient's pain and nausea and reassess.       Erik Moore MD  Attending Emergency  Physician            Stephanie Vasquez MD  04/13/23 3188

## 2023-04-14 ENCOUNTER — APPOINTMENT (OUTPATIENT)
Dept: GENERAL RADIOLOGY | Age: 28
End: 2023-04-14
Payer: MEDICAID

## 2023-04-14 ENCOUNTER — HOSPITAL ENCOUNTER (EMERGENCY)
Age: 28
Discharge: HOME | End: 2023-04-14
Attending: EMERGENCY MEDICINE
Payer: MEDICAID

## 2023-04-14 ENCOUNTER — APPOINTMENT (OUTPATIENT)
Dept: CT IMAGING | Age: 28
End: 2023-04-14
Payer: MEDICAID

## 2023-04-14 VITALS
TEMPERATURE: 99 F | DIASTOLIC BLOOD PRESSURE: 75 MMHG | OXYGEN SATURATION: 97 % | HEART RATE: 100 BPM | SYSTOLIC BLOOD PRESSURE: 122 MMHG | RESPIRATION RATE: 23 BRPM

## 2023-04-14 DIAGNOSIS — R10.11 ABDOMINAL PAIN, RIGHT UPPER QUADRANT: Primary | ICD-10-CM

## 2023-04-14 LAB
BILIRUBIN URINE: NEGATIVE
CASTS UA: NORMAL /LPF (ref 0–8)
COLOR: YELLOW
D DIMER BLD IA.RAPID-MCNC: 3.45 MG/L FEU (ref 0–0.57)
EPITHELIAL CELLS UA: NORMAL /HPF (ref 0–5)
GLUCOSE UR STRIP.AUTO-MCNC: NEGATIVE MG/DL
KETONES UR STRIP.AUTO-MCNC: NEGATIVE MG/DL
LEUKOCYTE ESTERASE UR QL STRIP.AUTO: NEGATIVE
NITRITE UR QL STRIP.AUTO: NEGATIVE
PROT UR STRIP.AUTO-MCNC: 8.5 MG/DL (ref 5–8)
PROT UR STRIP.AUTO-MCNC: ABNORMAL MG/DL
RBC CLUMPS #/AREA URNS AUTO: NORMAL /HPF (ref 0–4)
SARS-COV-2 RDRP RESP QL NAA+PROBE: NOT DETECTED
SPECIFIC GRAVITY UA: 1.02 (ref 1–1.03)
SPECIMEN DESCRIPTION: NORMAL
TURBIDITY: CLEAR
URINE HGB: NEGATIVE
UROBILINOGEN, URINE: NORMAL
WBC UA: NORMAL /HPF (ref 0–5)

## 2023-04-14 PROCEDURE — 87635 SARS-COV-2 COVID-19 AMP PRB: CPT

## 2023-04-14 PROCEDURE — 71046 X-RAY EXAM CHEST 2 VIEWS: CPT

## 2023-04-14 PROCEDURE — 99285 EMERGENCY DEPT VISIT HI MDM: CPT

## 2023-04-14 PROCEDURE — 6360000002 HC RX W HCPCS: Performed by: EMERGENCY MEDICINE

## 2023-04-14 PROCEDURE — 6370000000 HC RX 637 (ALT 250 FOR IP): Performed by: STUDENT IN AN ORGANIZED HEALTH CARE EDUCATION/TRAINING PROGRAM

## 2023-04-14 PROCEDURE — 85379 FIBRIN DEGRADATION QUANT: CPT

## 2023-04-14 PROCEDURE — 96374 THER/PROPH/DIAG INJ IV PUSH: CPT

## 2023-04-14 PROCEDURE — 81001 URINALYSIS AUTO W/SCOPE: CPT

## 2023-04-14 PROCEDURE — 71260 CT THORAX DX C+: CPT | Performed by: STUDENT IN AN ORGANIZED HEALTH CARE EDUCATION/TRAINING PROGRAM

## 2023-04-14 PROCEDURE — 6360000004 HC RX CONTRAST MEDICATION: Performed by: STUDENT IN AN ORGANIZED HEALTH CARE EDUCATION/TRAINING PROGRAM

## 2023-04-14 RX ORDER — KETOROLAC TROMETHAMINE 30 MG/ML
30 INJECTION, SOLUTION INTRAMUSCULAR; INTRAVENOUS ONCE
Status: COMPLETED | OUTPATIENT
Start: 2023-04-14 | End: 2023-04-14

## 2023-04-14 RX ORDER — KETOROLAC TROMETHAMINE 10 MG/1
10 TABLET, FILM COATED ORAL EVERY 6 HOURS PRN
Qty: 20 TABLET | Refills: 0 | Status: SHIPPED | OUTPATIENT
Start: 2023-04-14

## 2023-04-14 RX ORDER — PANTOPRAZOLE SODIUM 40 MG/1
40 TABLET, DELAYED RELEASE ORAL ONCE
Status: COMPLETED | OUTPATIENT
Start: 2023-04-14 | End: 2023-04-14

## 2023-04-14 RX ORDER — MAGNESIUM HYDROXIDE/ALUMINUM HYDROXICE/SIMETHICONE 120; 1200; 1200 MG/30ML; MG/30ML; MG/30ML
30 SUSPENSION ORAL ONCE
Status: COMPLETED | OUTPATIENT
Start: 2023-04-14 | End: 2023-04-14

## 2023-04-14 RX ORDER — GUAIFENESIN DEXTROMETHORPHAN HYDROBROMIDE ORAL SOLUTION 10; 100 MG/5ML; MG/5ML
10 SOLUTION ORAL ONCE
Status: COMPLETED | OUTPATIENT
Start: 2023-04-14 | End: 2023-04-14

## 2023-04-14 RX ADMIN — PANTOPRAZOLE SODIUM 40 MG: 40 TABLET, DELAYED RELEASE ORAL at 01:03

## 2023-04-14 RX ADMIN — KETOROLAC TROMETHAMINE 30 MG: 30 INJECTION, SOLUTION INTRAMUSCULAR at 02:26

## 2023-04-14 RX ADMIN — IOPAMIDOL 75 ML: 755 INJECTION, SOLUTION INTRAVENOUS at 02:40

## 2023-04-14 RX ADMIN — ALUMINUM HYDROXIDE, MAGNESIUM HYDROXIDE, AND SIMETHICONE 30 ML: 200; 200; 20 SUSPENSION ORAL at 01:03

## 2023-04-14 RX ADMIN — DEXTROMETHORPHAN HYDROBROMIDE, GUAIFENESIN 10 ML: 10; 100 LIQUID ORAL at 01:02

## 2023-04-14 ASSESSMENT — PAIN DESCRIPTION - ORIENTATION: ORIENTATION: RIGHT;UPPER

## 2023-04-14 ASSESSMENT — PAIN DESCRIPTION - DESCRIPTORS: DESCRIPTORS: SHARP;STABBING

## 2023-04-14 ASSESSMENT — ENCOUNTER SYMPTOMS
SHORTNESS OF BREATH: 0
COUGH: 1
VOMITING: 0
ABDOMINAL PAIN: 1
NAUSEA: 0

## 2023-04-14 ASSESSMENT — PAIN DESCRIPTION - LOCATION: LOCATION: ABDOMEN

## 2023-04-14 ASSESSMENT — PAIN SCALES - GENERAL
PAINLEVEL_OUTOF10: 8
PAINLEVEL_OUTOF10: 8

## 2023-04-14 NOTE — ED NOTES
Dr. Pita Patton ntfd F2F of elevated D-Dimer. New orders to follow.       Cynthia Chris RN  04/14/23 3027

## 2023-04-14 NOTE — ED PROVIDER NOTES
[facility]   Emergency Department  Faculty Attestation       I performed a history and physical examination of the patient and discussed management with the resident. I reviewed the residents note and agree with the documented findings including all diagnostic interpretations and plan of care. Any areas of disagreement are noted on the chart. I was personally present for the key portions of any procedures. I have documented in the chart those procedures where I was not present during the key portions. I have reviewed the emergency nurses triage note. I agree with the chief complaint, past medical history, past surgical history, allergies, medications, social and family history as documented unless otherwise noted below. For Physician Assistant/ Nurse Practitioner cases/documentation I have personally evaluated this patient and have completed at least one if not all key elements of the E/M (history, physical exam, and MDM). Additional findings are as noted. Patient Name: Cali Sunshine  MRN: [id number]  : 1995  Primary Care Physician: No primary care provider on file. Date of evaluationa: 2023   Note Started: 1:20 AM EDT    Pertinent Comments     Chief Complaint:   Chief Complaint   Patient presents with    Abdominal Pain    Emesis    Urinary Frequency     States going often and painful to urinate        Initial vitals: (If not listed, please see nursing documentation)  ED Triage Vitals [23 0037]   BP Temp Temp Source Heart Rate Resp SpO2 Height Weight   122/75 99 °F (37.2 °C) Oral 100 23 97 % -- --        HPI/PE/Impression: This is a 33 y.o. female who presents to the Emergency Department right sided upper abdominal pain that has been going on for several days. Was just evalauted in the ED with negative lab workup. However, pain restarted and is now having severe pain on the right side that is work with deep inspiration. No hemoptysis. Also having some mild dysuria.

## 2023-04-14 NOTE — ED NOTES
Pt presents to the ED with c/o R sided abd pain that has been worsening since she was evaluated in the ED yesterday. Pt also reports she feels like she is straining to urinate and that causes her pain to worsen. Pt does appear uncomfortable at this time, but no distress noted. RR even and unlabored. Call light within reach. Dr. Raffi Marie at bedside.       Debbie Covarrubias RN  04/14/23 0009

## 2023-04-14 NOTE — ED NOTES
The following labs were labeled with appropriate pt sticker and tubed to lab:     [] Blue     [] Lavender   [] on ice  [] Green/yellow  [] Green/black [] on ice  [] [place]  [] on ice  [] Yellow  [] Red  [] Type/ Screen  [] ABG  [] VBG    [x] COVID-19 swab    [x] Rapid  [] PCR  [] Flu swab  [] Peds Viral Panel     [] Urine Sample  [] Fecal Sample  [] Pelvic Cultures  [] Blood Cultures  [] X 2  [] STREP Cultures     Franki Ramirez RN  04/14/23 0207
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
## 2023-04-14 NOTE — ED NOTES
The following labs were labeled with appropriate pt sticker and tubed to lab:     [x] Blue     [x] Lavender   [] on ice  [x] Green/yellow  [] Green/black [] on ice  [] [place]  [] on ice  [] Yellow  [] Red  [] Type/ Screen  [] ABG  [] VBG    [] COVID-19 swab    [] Rapid  [] PCR  [] Flu swab  [] Peds Viral Panel     [x] Urine Sample  [] Fecal Sample  [] Pelvic Cultures  [] Blood Cultures  [] X 2  [] STREP Cultures       Moise Ganser, RN  04/14/23 010

## 2024-06-24 ENCOUNTER — HOSPITAL ENCOUNTER (EMERGENCY)
Age: 29
Discharge: HOME | End: 2024-06-24
Attending: EMERGENCY MEDICINE
Payer: MEDICAID

## 2024-06-24 ENCOUNTER — APPOINTMENT (OUTPATIENT)
Dept: CT IMAGING | Age: 29
End: 2024-06-24
Payer: MEDICAID

## 2024-06-24 VITALS
HEART RATE: 86 BPM | WEIGHT: 183.86 LBS | RESPIRATION RATE: 18 BRPM | TEMPERATURE: 97.3 F | BODY MASS INDEX: 27.96 KG/M2 | DIASTOLIC BLOOD PRESSURE: 87 MMHG | OXYGEN SATURATION: 100 % | SYSTOLIC BLOOD PRESSURE: 125 MMHG

## 2024-06-24 DIAGNOSIS — L02.91 ABSCESS: Primary | ICD-10-CM

## 2024-06-24 DIAGNOSIS — N39.0 URINARY TRACT INFECTION WITHOUT HEMATURIA, SITE UNSPECIFIED: ICD-10-CM

## 2024-06-24 LAB
ANION GAP SERPL CALCULATED.3IONS-SCNC: 13 MMOL/L (ref 9–16)
BACTERIA URNS QL MICRO: ABNORMAL
BASOPHILS # BLD: 0.03 K/UL (ref 0–0.2)
BASOPHILS NFR BLD: 0 % (ref 0–2)
BILIRUB UR QL STRIP: NEGATIVE
BUN SERPL-MCNC: 16 MG/DL (ref 6–20)
CALCIUM SERPL-MCNC: 8.7 MG/DL (ref 8.6–10.4)
CASTS #/AREA URNS LPF: ABNORMAL /LPF (ref 0–8)
CHLORIDE SERPL-SCNC: 104 MMOL/L (ref 98–107)
CLARITY UR: ABNORMAL
CO2 SERPL-SCNC: 23 MMOL/L (ref 20–31)
COLOR UR: YELLOW
CREAT SERPL-MCNC: 0.7 MG/DL (ref 0.5–0.9)
EOSINOPHIL # BLD: 0.05 K/UL (ref 0–0.44)
EOSINOPHILS RELATIVE PERCENT: 1 % (ref 1–4)
EPI CELLS #/AREA URNS HPF: ABNORMAL /HPF (ref 0–5)
ERYTHROCYTE [DISTWIDTH] IN BLOOD BY AUTOMATED COUNT: 16.4 % (ref 11.8–14.4)
GFR, ESTIMATED: >90 ML/MIN/1.73M2
GLUCOSE SERPL-MCNC: 94 MG/DL (ref 74–99)
GLUCOSE UR STRIP-MCNC: NEGATIVE MG/DL
HCG SERPL QL: NEGATIVE
HCT VFR BLD AUTO: 34.6 % (ref 36.3–47.1)
HGB BLD-MCNC: 11.3 G/DL (ref 11.9–15.1)
HGB UR QL STRIP.AUTO: NEGATIVE
IMM GRANULOCYTES # BLD AUTO: 0.03 K/UL (ref 0–0.3)
IMM GRANULOCYTES NFR BLD: 0 %
KETONES UR STRIP-MCNC: NEGATIVE MG/DL
LEUKOCYTE ESTERASE UR QL STRIP: NEGATIVE
LYMPHOCYTES NFR BLD: 3.02 K/UL (ref 1.1–3.7)
LYMPHOCYTES RELATIVE PERCENT: 34 % (ref 24–43)
MCH RBC QN AUTO: 29.7 PG (ref 25.2–33.5)
MCHC RBC AUTO-ENTMCNC: 32.7 G/DL (ref 28.4–34.8)
MCV RBC AUTO: 90.8 FL (ref 82.6–102.9)
MONOCYTES NFR BLD: 1.01 K/UL (ref 0.1–1.2)
MONOCYTES NFR BLD: 11 % (ref 3–12)
NEUTROPHILS NFR BLD: 54 % (ref 36–65)
NEUTS SEG NFR BLD: 4.76 K/UL (ref 1.5–8.1)
NITRITE UR QL STRIP: POSITIVE
NRBC BLD-RTO: 0 PER 100 WBC
PH UR STRIP: 6 [PH] (ref 5–8)
PLATELET # BLD AUTO: 180 K/UL (ref 138–453)
PMV BLD AUTO: 10.6 FL (ref 8.1–13.5)
POTASSIUM SERPL-SCNC: 3.7 MMOL/L (ref 3.7–5.3)
PROT UR STRIP-MCNC: ABNORMAL MG/DL
RBC # BLD AUTO: 3.81 M/UL (ref 3.95–5.11)
RBC # BLD: ABNORMAL 10*6/UL
RBC #/AREA URNS HPF: ABNORMAL /HPF (ref 0–4)
SODIUM SERPL-SCNC: 140 MMOL/L (ref 136–145)
SP GR UR STRIP: 1.03 (ref 1–1.03)
UROBILINOGEN UR STRIP-ACNC: NORMAL EU/DL (ref 0–1)
WBC #/AREA URNS HPF: ABNORMAL /HPF (ref 0–5)
WBC OTHER # BLD: 8.9 K/UL (ref 3.5–11.3)

## 2024-06-24 PROCEDURE — 6370000000 HC RX 637 (ALT 250 FOR IP): Performed by: HEALTH CARE PROVIDER

## 2024-06-24 PROCEDURE — 80048 BASIC METABOLIC PNL TOTAL CA: CPT

## 2024-06-24 PROCEDURE — 6360000004 HC RX CONTRAST MEDICATION: Performed by: HEALTH CARE PROVIDER

## 2024-06-24 PROCEDURE — 84703 CHORIONIC GONADOTROPIN ASSAY: CPT

## 2024-06-24 PROCEDURE — 81001 URINALYSIS AUTO W/SCOPE: CPT

## 2024-06-24 PROCEDURE — 72193 CT PELVIS W/DYE: CPT

## 2024-06-24 PROCEDURE — 99285 EMERGENCY DEPT VISIT HI MDM: CPT

## 2024-06-24 PROCEDURE — 85025 COMPLETE CBC W/AUTO DIFF WBC: CPT

## 2024-06-24 PROCEDURE — 6360000002 HC RX W HCPCS: Performed by: HEALTH CARE PROVIDER

## 2024-06-24 PROCEDURE — 10060 I&D ABSCESS SIMPLE/SINGLE: CPT

## 2024-06-24 PROCEDURE — 96374 THER/PROPH/DIAG INJ IV PUSH: CPT

## 2024-06-24 RX ORDER — SULFAMETHOXAZOLE AND TRIMETHOPRIM 800; 160 MG/1; MG/1
1 TABLET ORAL 2 TIMES DAILY
Qty: 10 TABLET | Refills: 0 | Status: SHIPPED | OUTPATIENT
Start: 2024-06-24 | End: 2024-06-29

## 2024-06-24 RX ORDER — CEPHALEXIN 500 MG/1
500 CAPSULE ORAL 2 TIMES DAILY
Qty: 10 CAPSULE | Refills: 0 | Status: SHIPPED | OUTPATIENT
Start: 2024-06-24 | End: 2024-06-29

## 2024-06-24 RX ORDER — ACETAMINOPHEN 500 MG
1000 TABLET ORAL ONCE
Status: COMPLETED | OUTPATIENT
Start: 2024-06-24 | End: 2024-06-24

## 2024-06-24 RX ORDER — KETOROLAC TROMETHAMINE 30 MG/ML
30 INJECTION, SOLUTION INTRAMUSCULAR; INTRAVENOUS ONCE
Status: COMPLETED | OUTPATIENT
Start: 2024-06-24 | End: 2024-06-24

## 2024-06-24 RX ADMIN — KETOROLAC TROMETHAMINE 30 MG: 30 INJECTION, SOLUTION INTRAMUSCULAR; INTRAVENOUS at 08:36

## 2024-06-24 RX ADMIN — ACETAMINOPHEN 1000 MG: 500 TABLET ORAL at 08:36

## 2024-06-24 RX ADMIN — IOPAMIDOL 75 ML: 755 INJECTION, SOLUTION INTRAVENOUS at 09:18

## 2024-06-24 ASSESSMENT — ENCOUNTER SYMPTOMS
ABDOMINAL PAIN: 0
ROS SKIN COMMENTS: ABSCESS
SHORTNESS OF BREATH: 0
VOMITING: 0
DIARRHEA: 0
CONSTIPATION: 0
SORE THROAT: 0
NAUSEA: 0

## 2024-06-24 ASSESSMENT — LIFESTYLE VARIABLES
HOW OFTEN DO YOU HAVE A DRINK CONTAINING ALCOHOL: MONTHLY OR LESS
HOW MANY STANDARD DRINKS CONTAINING ALCOHOL DO YOU HAVE ON A TYPICAL DAY: 1 OR 2

## 2024-06-24 ASSESSMENT — PAIN SCALES - GENERAL
PAINLEVEL_OUTOF10: 7
PAINLEVEL_OUTOF10: 8

## 2024-06-24 ASSESSMENT — PAIN DESCRIPTION - LOCATION: LOCATION: BUTTOCKS

## 2024-06-24 ASSESSMENT — PAIN DESCRIPTION - DESCRIPTORS: DESCRIPTORS: THROBBING

## 2024-06-24 ASSESSMENT — PAIN - FUNCTIONAL ASSESSMENT: PAIN_FUNCTIONAL_ASSESSMENT: 0-10

## 2024-06-24 NOTE — ED NOTES
Pt presents to ED via walking through triage c/o abscess near buttocks that began yesterday. Pt reports trying to pop abscess but could not. Pt reports abscess is hard. Pt denies any drainage from abscess. Pt reports difficulty urinating. Pt denies any pain/burning upon urination. Pt stated, \"I got three kids, I know what a UTI is.\" Pt also reported coming back from [place] yesterday and \"drinking a lot.\" Pt denies any fever, chills. Pt denies any blood in urine. Pt denies any vaginal discharge.  Pt is Aox4 and ambulatory with steady gait.  Pt denies any illicit drug use or daily alcohol use.  NAD noted.  Call light within reach.

## 2024-06-24 NOTE — DISCHARGE INSTRUCTIONS
Call today or tomorrow to follow up with your primary care provider in the next 3 to 5 days.  If you do have a primary care provider [facility] with information provided for you here.    Please take antibiotics as prescribed, this will cover you both for the urinary tract infection as well as prevent any worsening infection.    Take your medication as indicated, if you are given an antibiotic then make sure you get the prescription filled and take the antibiotics until finished.  Drink plenty of water while taking the antibiotics.  Avoid drinking alcohol or drinks that have caffeine in it while taking antibiotics.       [facility], [employer], [employer] has some antibiotics for free; [employer] and [employer] has a 4 dollar prescription plan for some antibiotics.    Use ibuprofen or Tylenol (unless prescribed medications that have Tylenol in it) for pain.  You can take over the counter Ibuprofen (advil) tablets (4 every 8 hours or 3 every 6 hours or 2 every 4 hours)    Return to the Emergency Department for redness around wound, white drainage from wound, fever > 101.5, excessive nausea or vomiting, any other care or concern.

## 2024-06-24 NOTE — ED NOTES
Pt ambulated to restroom with steady and even gait. Pt provided with labeled specimen cup for urine sample.

## 2024-06-24 NOTE — ED NOTES
The following labs were labeled with appropriate pt sticker and tubed to lab:     [x] Blue     [x] Lavender   [] on ice  [x] Green/yellow  [x] Green/black [] on ice  [] Mi  [] on ice  [x] Yellow  [] Red  [] Pink  [] Type/ Screen  [] ABG  [] VBG    [] COVID-19 swab    [] Rapid  [] PCR  [] Flu swab  [] Peds Viral Panel     [] Urine Sample  [] Fecal Sample  [] Pelvic Cultures  [] Blood Cultures  [] X 2  [] STREP Cultures  [] Wound Cultures

## 2024-06-24 NOTE — ED PROVIDER NOTES
[facility] ED  eMERGENCY dEPARTMENT eNCOUnter   Attending Attestation     Pt Name: Agustin Rothman  MRN: [id number]  Birthdate 1995  Date of evaluation: 6/24/24       Agustin Rothman is a 28 y.o. female who presents with Abscess and Dysuria      10:10 AM EDT      History: Patient presents with painful urination and abscess over the left buttocks near the perineum.  Patient denies any painful defecation.  Patient says does hurt when she wipes over that area but has no painful defecation.    Exam: Heart rate and rhythm are regular.  Lungs are clear to auscultation bilaterally.  Abdomen is soft, nontender.  Patient is awake and alert and acting appropriate.    Plan for CT and potential drainage in the emergency department for this abscess.  Patient will require antibiotics.  Likely discharge.  Will check urine.        I performed a history and physical examination of the patient and discussed management with the resident. I reviewed the resident’s note and agree with the documented findings and plan of care. Any areas of disagreement are noted on the chart. I was personally present for the key portions of any procedures. I have documented in the chart those procedures where I was not present during the key portions. I have personally reviewed all images and agree with the resident's interpretation. I have reviewed the emergency nurses triage note. I agree with the chief complaint, past medical history, past surgical history, allergies, medications, social and family history as documented unless otherwise noted below. Documentation of the HPI, Physical Exam and Medical Decision Making performed by medical students or scribes is based on my personal performance of the HPI, PE and MDM. For Phys Assistant/ Nurse Practitioner cases/documentation I have had a face to face evaluation of this patient and have completed at least one if not all key elements of the E/M (history, physical exam, and MDM). 
Urinary tract infection without hematuria, site unspecified          DISPOSITION / PLAN     DISPOSITION Decision To Discharge 06/24/2024 11:58:16 AM      PATIENT REFERRED TO:  [facility] ED  [address] [id number] [phone]  Go to   As needed, If symptoms worsen    [facility] AT [facility]  [address] [id number] [phone]  Schedule an appointment as soon as possible for a visit   to establish care if in need of a primary care provider    [facility] INTERNAL MEDICINE  [address] [address]    Call [phone] to establish an appointment to establish care.      DISCHARGE MEDICATIONS:  Discharge Medication List as of 6/24/2024 11:59 AM        START taking these medications    Details   cephALEXin (KEFLEX) 500 MG capsule Take 1 capsule by mouth 2 times daily for 5 days, Disp-10 capsule, R-0Normal      sulfamethoxazole-trimethoprim (BACTRIM DS;SEPTRA DS) 800-160 MG per tablet Take 1 tablet by mouth 2 times daily for 5 days, Disp-10 tablet, R-0Normal             Jose Manuel Benito DO  Emergency Medicine Resident    (Please note that portions of this note were completed with a voice recognition program.  Efforts were made to edit the dictations but occasionally words are mis-transcribed.)

## 2024-12-21 ENCOUNTER — APPOINTMENT (OUTPATIENT)
Dept: GENERAL RADIOLOGY | Age: 29
End: 2024-12-21
Payer: MEDICAID

## 2024-12-21 ENCOUNTER — HOSPITAL ENCOUNTER (EMERGENCY)
Age: 29
Discharge: HOME | End: 2024-12-21
Attending: EMERGENCY MEDICINE
Payer: MEDICAID

## 2024-12-21 VITALS
OXYGEN SATURATION: 100 % | HEART RATE: 72 BPM | WEIGHT: 150 LBS | SYSTOLIC BLOOD PRESSURE: 121 MMHG | DIASTOLIC BLOOD PRESSURE: 71 MMHG | TEMPERATURE: 97.7 F | RESPIRATION RATE: 18 BRPM | BODY MASS INDEX: 23.54 KG/M2 | HEIGHT: 67 IN

## 2024-12-21 DIAGNOSIS — S92.301A: Primary | ICD-10-CM

## 2024-12-21 PROCEDURE — 99284 EMERGENCY DEPT VISIT MOD MDM: CPT

## 2024-12-21 PROCEDURE — 73610 X-RAY EXAM OF ANKLE: CPT

## 2024-12-21 PROCEDURE — 96372 THER/PROPH/DIAG INJ SC/IM: CPT

## 2024-12-21 PROCEDURE — 6360000002 HC RX W HCPCS

## 2024-12-21 PROCEDURE — 73630 X-RAY EXAM OF FOOT: CPT

## 2024-12-21 RX ORDER — KETOROLAC TROMETHAMINE 15 MG/ML
15 INJECTION, SOLUTION INTRAMUSCULAR; INTRAVENOUS ONCE
Status: COMPLETED | OUTPATIENT
Start: 2024-12-21 | End: 2024-12-21

## 2024-12-21 RX ADMIN — KETOROLAC TROMETHAMINE 15 MG: 15 INJECTION, SOLUTION INTRAMUSCULAR; INTRAVENOUS at 05:27

## 2024-12-21 ASSESSMENT — PAIN DESCRIPTION - ORIENTATION: ORIENTATION: RIGHT

## 2024-12-21 ASSESSMENT — ENCOUNTER SYMPTOMS
NAUSEA: 0
SHORTNESS OF BREATH: 0

## 2024-12-21 ASSESSMENT — PAIN SCALES - GENERAL: PAINLEVEL_OUTOF10: 6

## 2024-12-21 ASSESSMENT — PAIN - FUNCTIONAL ASSESSMENT: PAIN_FUNCTIONAL_ASSESSMENT: 0-10

## 2024-12-21 NOTE — ED PROVIDER NOTES
[facility]     Emergency Department     Faculty Note/ Attestation      Pt Name: Agustin Rothman                                       MRN: [id number]  Birthdate 1995  Date of evaluation: 12/21/2024    Patients PCP:    No primary care provider on file.    Note Started: 5:21 AM EST      Attestation  I performed a history and physical examination of the patient and discussed management with the resident. I reviewed the resident’s note and agree with the documented findings and plan of care. Any areas of disagreement are noted on the chart. I was personally present for the key portions of any procedures. I have documented in the chart those procedures where I was not present during the key portions. I have reviewed the emergency nurses triage note. I agree with the chief complaint, past medical history, past surgical history, allergies, medications, social and family history as documented unless otherwise noted below.    For Physician Assistant/ Nurse Practitioner cases/documentation I have personally evaluated this patient and have completed at least one if not all key elements of the E/M (history, physical exam, and MDM). Additional findings are as noted.      Initial Screens:        [place] Coma Scale  Eye Opening: Spontaneous  Best Verbal Response: Oriented  Best Motor Response: Obeys commands  [place] Coma Scale Score: 15    Vitals:    Vitals:    12/21/24 0459 12/21/24 0500   BP: 121/71    Pulse: 72    Resp:  18   Temp: 97.7 °F (36.5 °C)    SpO2: 100%    Weight:  68 kg (150 lb)   Height:  1.702 m (5' 7\")       CHIEF COMPLAINT       Chief Complaint   Patient presents with    Ankle Pain     Right Ankle Injury             DIAGNOSTIC RESULTS             RADIOLOGY:   XR ANKLE RIGHT (MIN 3 VIEWS)    (Results Pending)   XR FOOT RIGHT (MIN 3 VIEWS)    (Results Pending)         LABS:  Labs Reviewed - No data to display      EMERGENCY DEPARTMENT COURSE:     -------------------------  BP: 121/71,

## 2024-12-21 NOTE — DISCHARGE INSTRUCTIONS
You were evaluated for your right foot pain and swelling.  On x-ray, you were found to have a right fifth metatarsal fracture, this is a thin nondisplaced break in the bone of your foot.  You will be placed in a orthopedic boot and given crutches.  Please follow-up with podiatry in 2 weeks for repeat evaluation.    Put ice or a cold pack on your foot for 10 to 20 minutes at a time. Try to do this every 1 to 2 hours for the next 3 days (when you are awake) or until the swelling goes down. Put a thin cloth between the ice and your skin.  Prop up your foot on a pillow when you ice it or anytime you sit or lie down for the next 3 days. Try to keep it above the level of your heart. This will help reduce swelling.  Take 600 mg ibuprofen every 8 hours to help with pain and swelling.    Please return to the emergency department if you develop weakness, numbness, worsening pain or swelling, or other concerning symptoms.
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
## 2025-01-02 ENCOUNTER — HOSPITAL ENCOUNTER (EMERGENCY)
Age: 30
Discharge: HOME | End: 2025-01-03
Attending: EMERGENCY MEDICINE
Payer: MEDICAID

## 2025-01-02 VITALS
BODY MASS INDEX: 26.68 KG/M2 | SYSTOLIC BLOOD PRESSURE: 138 MMHG | WEIGHT: 170 LBS | OXYGEN SATURATION: 95 % | DIASTOLIC BLOOD PRESSURE: 96 MMHG | HEART RATE: 122 BPM | HEIGHT: 67 IN | TEMPERATURE: 98.8 F | RESPIRATION RATE: 14 BRPM

## 2025-01-02 DIAGNOSIS — T22.292A SECOND DEGREE BURN OF MULTIPLE SITES OF LEFT SHOULDER AND UPPER EXTREMITY EXCEPT WRIST AND HAND, INITIAL ENCOUNTER: Primary | ICD-10-CM

## 2025-01-02 PROCEDURE — 99284 EMERGENCY DEPT VISIT MOD MDM: CPT

## 2025-01-02 RX ORDER — KETOROLAC TROMETHAMINE 15 MG/ML
30 INJECTION, SOLUTION INTRAMUSCULAR; INTRAVENOUS ONCE
Status: COMPLETED | OUTPATIENT
Start: 2025-01-03 | End: 2025-01-03

## 2025-01-02 ASSESSMENT — PAIN SCALES - GENERAL: PAINLEVEL_OUTOF10: 10

## 2025-01-02 ASSESSMENT — LIFESTYLE VARIABLES
HOW MANY STANDARD DRINKS CONTAINING ALCOHOL DO YOU HAVE ON A TYPICAL DAY: 3 OR 4
HOW OFTEN DO YOU HAVE A DRINK CONTAINING ALCOHOL: 2-4 TIMES A MONTH

## 2025-01-02 ASSESSMENT — PAIN DESCRIPTION - LOCATION: LOCATION: HAND

## 2025-01-02 ASSESSMENT — PAIN DESCRIPTION - ORIENTATION: ORIENTATION: LEFT

## 2025-01-02 ASSESSMENT — PAIN DESCRIPTION - DESCRIPTORS: DESCRIPTORS: SORE;BURNING

## 2025-01-02 ASSESSMENT — PAIN - FUNCTIONAL ASSESSMENT: PAIN_FUNCTIONAL_ASSESSMENT: 0-10

## 2025-01-03 PROBLEM — T23.202A SECOND DEGREE BURN OF LEFT WRIST AND HAND: Status: ACTIVE | Noted: 2025-01-03

## 2025-01-03 PROBLEM — T23.272A SECOND DEGREE BURN OF LEFT WRIST AND HAND: Status: ACTIVE | Noted: 2025-01-03

## 2025-01-03 PROCEDURE — 6370000000 HC RX 637 (ALT 250 FOR IP)

## 2025-01-03 PROCEDURE — 96372 THER/PROPH/DIAG INJ SC/IM: CPT

## 2025-01-03 PROCEDURE — 6360000002 HC RX W HCPCS

## 2025-01-03 RX ORDER — BACITRACIN ZINC AND POLYMYXIN B SULFATE 500; 1000 [USP'U]/G; [USP'U]/G
OINTMENT TOPICAL
Qty: 15 G | Refills: 1 | Status: SHIPPED | OUTPATIENT
Start: 2025-01-03 | End: 2025-01-10

## 2025-01-03 RX ORDER — HYDROCODONE BITARTRATE AND ACETAMINOPHEN 5; 325 MG/1; MG/1
1 TABLET ORAL ONCE
Status: COMPLETED | OUTPATIENT
Start: 2025-01-03 | End: 2025-01-03

## 2025-01-03 RX ADMIN — KETOROLAC TROMETHAMINE 30 MG: 15 INJECTION, SOLUTION INTRAMUSCULAR; INTRAVENOUS at 00:00

## 2025-01-03 RX ADMIN — HYDROCODONE BITARTRATE AND ACETAMINOPHEN 1 TABLET: 5; 325 TABLET ORAL at 01:29

## 2025-01-03 ASSESSMENT — ENCOUNTER SYMPTOMS
GASTROINTESTINAL NEGATIVE: 1
EYES NEGATIVE: 1
ALLERGIC/IMMUNOLOGIC NEGATIVE: 1
RESPIRATORY NEGATIVE: 1
ROS SKIN COMMENTS: BURN TO LEFT HAND
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
## 2025-01-07 ENCOUNTER — TELEPHONE (OUTPATIENT)
Dept: SURGERY | Age: 30
End: 2025-01-07